# Patient Record
Sex: MALE | Race: WHITE | NOT HISPANIC OR LATINO | Employment: FULL TIME | URBAN - METROPOLITAN AREA
[De-identification: names, ages, dates, MRNs, and addresses within clinical notes are randomized per-mention and may not be internally consistent; named-entity substitution may affect disease eponyms.]

---

## 2018-06-05 ENCOUNTER — APPOINTMENT (OUTPATIENT)
Dept: CT IMAGING | Facility: HOSPITAL | Age: 43
End: 2018-06-05

## 2018-06-05 ENCOUNTER — HOSPITAL ENCOUNTER (EMERGENCY)
Facility: HOSPITAL | Age: 43
Discharge: HOME | End: 2018-06-05
Attending: EMERGENCY MEDICINE | Admitting: EMERGENCY MEDICINE

## 2018-06-05 VITALS
HEART RATE: 68 BPM | HEIGHT: 73 IN | WEIGHT: 185 LBS | BODY MASS INDEX: 24.52 KG/M2 | TEMPERATURE: 98.6 F | RESPIRATION RATE: 16 BRPM | DIASTOLIC BLOOD PRESSURE: 54 MMHG | SYSTOLIC BLOOD PRESSURE: 134 MMHG | OXYGEN SATURATION: 99 %

## 2018-06-05 DIAGNOSIS — E86.0 DEHYDRATION: ICD-10-CM

## 2018-06-05 DIAGNOSIS — R10.9 ABDOMINAL PAIN, UNSPECIFIED ABDOMINAL LOCATION: Primary | ICD-10-CM

## 2018-06-05 DIAGNOSIS — K57.90 DIVERTICULOSIS OF INTESTINE WITHOUT BLEEDING, UNSPECIFIED INTESTINAL TRACT LOCATION: ICD-10-CM

## 2018-06-05 DIAGNOSIS — K52.9 GASTROENTERITIS: ICD-10-CM

## 2018-06-05 LAB
ALBUMIN SERPL-MCNC: 4.51 G/DL (ref 3.2–4.8)
ALBUMIN/GLOB SERPL: 1.5 G/DL (ref 1.5–2.5)
ALP SERPL-CCNC: 76 U/L (ref 25–100)
ALT SERPL W P-5'-P-CCNC: 45 U/L (ref 7–40)
ANION GAP SERPL CALCULATED.3IONS-SCNC: 8 MMOL/L (ref 3–11)
AST SERPL-CCNC: 25 U/L (ref 0–33)
BASOPHILS # BLD AUTO: 0.02 10*3/MM3 (ref 0–0.2)
BASOPHILS NFR BLD AUTO: 0.4 % (ref 0–1)
BILIRUB SERPL-MCNC: 0.8 MG/DL (ref 0.3–1.2)
BILIRUB UR QL STRIP: NEGATIVE
BUN BLD-MCNC: 13 MG/DL (ref 9–23)
BUN/CREAT SERPL: 14.3 (ref 7–25)
CALCIUM SPEC-SCNC: 9.2 MG/DL (ref 8.7–10.4)
CHLORIDE SERPL-SCNC: 106 MMOL/L (ref 99–109)
CLARITY UR: CLEAR
CO2 SERPL-SCNC: 27 MMOL/L (ref 20–31)
COLOR UR: ABNORMAL
CREAT BLD-MCNC: 0.91 MG/DL (ref 0.6–1.3)
CRP SERPL-MCNC: 0.4 MG/DL (ref 0–1)
D-LACTATE SERPL-SCNC: 1.4 MMOL/L (ref 0.5–2)
DEPRECATED RDW RBC AUTO: 36.7 FL (ref 37–54)
EOSINOPHIL # BLD AUTO: 0.27 10*3/MM3 (ref 0–0.3)
EOSINOPHIL NFR BLD AUTO: 5.6 % (ref 0–3)
ERYTHROCYTE [DISTWIDTH] IN BLOOD BY AUTOMATED COUNT: 11.9 % (ref 11.3–14.5)
ERYTHROCYTE [SEDIMENTATION RATE] IN BLOOD: 6 MM/HR (ref 0–15)
GFR SERPL CREATININE-BSD FRML MDRD: 92 ML/MIN/1.73
GLOBULIN UR ELPH-MCNC: 3 GM/DL
GLUCOSE BLD-MCNC: 109 MG/DL (ref 70–100)
GLUCOSE UR STRIP-MCNC: NEGATIVE MG/DL
HCT VFR BLD AUTO: 44.6 % (ref 38.9–50.9)
HGB BLD-MCNC: 15.8 G/DL (ref 13.1–17.5)
HGB UR QL STRIP.AUTO: NEGATIVE
HOLD SPECIMEN: NORMAL
HOLD SPECIMEN: NORMAL
IMM GRANULOCYTES # BLD: 0.01 10*3/MM3 (ref 0–0.03)
IMM GRANULOCYTES NFR BLD: 0.2 % (ref 0–0.6)
KETONES UR QL STRIP: NEGATIVE
LEUKOCYTE ESTERASE UR QL STRIP.AUTO: NEGATIVE
LIPASE SERPL-CCNC: 42 U/L (ref 6–51)
LYMPHOCYTES # BLD AUTO: 1.89 10*3/MM3 (ref 0.6–4.8)
LYMPHOCYTES NFR BLD AUTO: 39.1 % (ref 24–44)
MCH RBC QN AUTO: 30.3 PG (ref 27–31)
MCHC RBC AUTO-ENTMCNC: 35.4 G/DL (ref 32–36)
MCV RBC AUTO: 85.4 FL (ref 80–99)
MONOCYTES # BLD AUTO: 0.26 10*3/MM3 (ref 0–1)
MONOCYTES NFR BLD AUTO: 5.4 % (ref 0–12)
NEUTROPHILS # BLD AUTO: 2.39 10*3/MM3 (ref 1.5–8.3)
NEUTROPHILS NFR BLD AUTO: 49.5 % (ref 41–71)
NITRITE UR QL STRIP: NEGATIVE
PH UR STRIP.AUTO: 6 [PH] (ref 5–8)
PLATELET # BLD AUTO: 233 10*3/MM3 (ref 150–450)
PMV BLD AUTO: 10.6 FL (ref 6–12)
POTASSIUM BLD-SCNC: 3.6 MMOL/L (ref 3.5–5.5)
PROT SERPL-MCNC: 7.5 G/DL (ref 5.7–8.2)
PROT UR QL STRIP: NEGATIVE
RBC # BLD AUTO: 5.22 10*6/MM3 (ref 4.2–5.76)
SODIUM BLD-SCNC: 141 MMOL/L (ref 132–146)
SP GR UR STRIP: >=1.03 (ref 1–1.03)
UROBILINOGEN UR QL STRIP: ABNORMAL
WBC NRBC COR # BLD: 4.83 10*3/MM3 (ref 3.5–10.8)
WHOLE BLOOD HOLD SPECIMEN: NORMAL
WHOLE BLOOD HOLD SPECIMEN: NORMAL

## 2018-06-05 PROCEDURE — 85025 COMPLETE CBC W/AUTO DIFF WBC: CPT

## 2018-06-05 PROCEDURE — 81003 URINALYSIS AUTO W/O SCOPE: CPT | Performed by: EMERGENCY MEDICINE

## 2018-06-05 PROCEDURE — 80053 COMPREHEN METABOLIC PANEL: CPT

## 2018-06-05 PROCEDURE — 96374 THER/PROPH/DIAG INJ IV PUSH: CPT

## 2018-06-05 PROCEDURE — 83605 ASSAY OF LACTIC ACID: CPT

## 2018-06-05 PROCEDURE — 96361 HYDRATE IV INFUSION ADD-ON: CPT

## 2018-06-05 PROCEDURE — 25010000002 ONDANSETRON PER 1 MG: Performed by: EMERGENCY MEDICINE

## 2018-06-05 PROCEDURE — 86140 C-REACTIVE PROTEIN: CPT | Performed by: EMERGENCY MEDICINE

## 2018-06-05 PROCEDURE — 85652 RBC SED RATE AUTOMATED: CPT | Performed by: EMERGENCY MEDICINE

## 2018-06-05 PROCEDURE — 83690 ASSAY OF LIPASE: CPT

## 2018-06-05 PROCEDURE — 74176 CT ABD & PELVIS W/O CONTRAST: CPT

## 2018-06-05 PROCEDURE — 99284 EMERGENCY DEPT VISIT MOD MDM: CPT

## 2018-06-05 RX ORDER — SODIUM CHLORIDE 9 MG/ML
200 INJECTION, SOLUTION INTRAVENOUS CONTINUOUS
Status: DISCONTINUED | OUTPATIENT
Start: 2018-06-05 | End: 2018-06-05 | Stop reason: HOSPADM

## 2018-06-05 RX ORDER — ONDANSETRON 2 MG/ML
4 INJECTION INTRAMUSCULAR; INTRAVENOUS ONCE
Status: COMPLETED | OUTPATIENT
Start: 2018-06-05 | End: 2018-06-05

## 2018-06-05 RX ORDER — ONDANSETRON 4 MG/1
4-8 TABLET, ORALLY DISINTEGRATING ORAL EVERY 6 HOURS PRN
Qty: 20 TABLET | Refills: 0 | Status: SHIPPED | OUTPATIENT
Start: 2018-06-05 | End: 2021-08-09

## 2018-06-05 RX ORDER — SODIUM CHLORIDE 0.9 % (FLUSH) 0.9 %
10 SYRINGE (ML) INJECTION AS NEEDED
Status: DISCONTINUED | OUTPATIENT
Start: 2018-06-05 | End: 2018-06-05 | Stop reason: HOSPADM

## 2018-06-05 RX ADMIN — ONDANSETRON 4 MG: 2 INJECTION, SOLUTION INTRAMUSCULAR; INTRAVENOUS at 18:46

## 2018-06-05 RX ADMIN — BARIUM SULFATE 450 ML: 21 SUSPENSION ORAL at 18:45

## 2018-06-05 RX ADMIN — SODIUM CHLORIDE 200 ML/HR: 9 INJECTION, SOLUTION INTRAVENOUS at 18:46

## 2018-06-05 RX ADMIN — SODIUM CHLORIDE 1000 ML: 9 INJECTION, SOLUTION INTRAVENOUS at 18:46

## 2018-06-05 NOTE — ED PROVIDER NOTES
Subjective   Panda Garcia is a 42 y.o. male who presents to the ED with c/o lower abdominal pain with onset yesterday at 1000. The pain is described as a stabbing sensation and was constant during initial onset. However, the pain became intermittent overtime, and was partially resolved upon examination. The patient also complains of associated diarrhea x10 yesterday and blood in his stool, but denies fever, chills, sore throat, congestion, SoA, or any other acute complaints at this time. He has no hx of abdominal surgeries.             History provided by:  Patient  Abdominal Pain   Pain location:  LLQ and RLQ  Pain quality: stabbing    Pain radiates to:  Does not radiate  Pain severity:  Unable to specify  Onset quality:  Sudden  Duration: Onset yesterday.  Timing:  Intermittent  Progression:  Resolved  Chronicity:  New  Relieved by:  None tried  Worsened by:  Nothing  Ineffective treatments:  None tried  Associated symptoms: diarrhea    Associated symptoms: no chills, no cough, no fever, no shortness of breath and no sore throat        Review of Systems   Constitutional: Negative for chills and fever.   HENT: Negative for congestion and sore throat.    Respiratory: Negative for cough and shortness of breath.    Gastrointestinal: Positive for abdominal pain, blood in stool and diarrhea.   Genitourinary:        Darker color urine   All other systems reviewed and are negative.      History reviewed. No pertinent past medical history.    Allergies   Allergen Reactions   • Iodinated Diagnostic Agents Hives       Past Surgical History:   Procedure Laterality Date   • KNEE SURGERY         History reviewed. No pertinent family history.    Social History     Social History   • Marital status:      Social History Main Topics   • Smoking status: Former Smoker   • Alcohol use Yes      Comment: occasional   • Drug use: No     Other Topics Concern   • Not on file         Objective   Physical Exam   Constitutional: He is  oriented to person, place, and time. He appears well-developed and well-nourished. No distress.   HENT:   Head: Normocephalic and atraumatic.   Nose: Nose normal.   Mouth/Throat: Oropharynx is clear and moist.   Eyes: Conjunctivae are normal. No scleral icterus.   Neck: Normal range of motion. Neck supple.   Cardiovascular: Normal rate, regular rhythm, normal heart sounds and intact distal pulses.    No murmur heard.  There is no tachycardia present   Pulmonary/Chest: Effort normal and breath sounds normal. No respiratory distress.   Abdominal: Soft. Bowel sounds are normal. There is tenderness (Lower abdominal with palpation). There is no rebound, no guarding and no CVA tenderness.   Musculoskeletal: Normal range of motion.   Neurological: He is alert and oriented to person, place, and time.   Skin: Skin is warm and dry.   Psychiatric: He has a normal mood and affect. His behavior is normal.   Nursing note and vitals reviewed.      Procedures         ED Course  ED Course as of Jun 05 2121   Tue Jun 05, 2018 2107 CT examination is unremarkable other than hypodensities in the kidneys thought to be cysts.  Results are obtained for comparison showingBilateral renal cysts, including a 1.5 cm right upper pole cyst.  [HH]   2108 Dr. Mcclelland is at the bedside re evaluating the patient  [DT]   2117 Patient is evaluated and much improved.  He voided in the emergency department.  He is finishing his second liter of IV fluids.  He has not had recurrence of the sharp abdominal pain he had previously.  He has never had peritoneal findingsI discussed abdominal pain precautions and indications for immediate return including residual significant pain tomorrow fever with pain or acceleration of symptoms among others.  I discussed mandatory follow-up with Dr. Lomeli as he reports he has chronic rectal bleeding, though did get a colonoscopy for this and an initial GI evaluation.  I discussed the importance of reevaluation nowI discussed  indications for follow-up with his PCP as wellVery pleasant and reliable patient verbalizes understanding agreement with plan of care, need for follow-up, and indications for immediate return  [HH]      ED Course User Index  [DT] Brandon Stark  [HH] Greg Mcclelland MD     Recent Results (from the past 24 hour(s))   Comprehensive Metabolic Panel    Collection Time: 06/05/18  4:57 PM   Result Value Ref Range    Glucose 109 (H) 70 - 100 mg/dL    BUN 13 9 - 23 mg/dL    Creatinine 0.91 0.60 - 1.30 mg/dL    Sodium 141 132 - 146 mmol/L    Potassium 3.6 3.5 - 5.5 mmol/L    Chloride 106 99 - 109 mmol/L    CO2 27.0 20.0 - 31.0 mmol/L    Calcium 9.2 8.7 - 10.4 mg/dL    Total Protein 7.5 5.7 - 8.2 g/dL    Albumin 4.51 3.20 - 4.80 g/dL    ALT (SGPT) 45 (H) 7 - 40 U/L    AST (SGOT) 25 0 - 33 U/L    Alkaline Phosphatase 76 25 - 100 U/L    Total Bilirubin 0.8 0.3 - 1.2 mg/dL    eGFR Non African Amer 92 >60 mL/min/1.73    Globulin 3.0 gm/dL    A/G Ratio 1.5 1.5 - 2.5 g/dL    BUN/Creatinine Ratio 14.3 7.0 - 25.0    Anion Gap 8.0 3.0 - 11.0 mmol/L   Lipase    Collection Time: 06/05/18  4:57 PM   Result Value Ref Range    Lipase 42 6 - 51 U/L   Light Blue Top    Collection Time: 06/05/18  4:57 PM   Result Value Ref Range    Extra Tube hold for add-on    Green Top (Gel)    Collection Time: 06/05/18  4:57 PM   Result Value Ref Range    Extra Tube Hold for add-ons.    Lavender Top    Collection Time: 06/05/18  4:57 PM   Result Value Ref Range    Extra Tube hold for add-on    Gold Top - SST    Collection Time: 06/05/18  4:57 PM   Result Value Ref Range    Extra Tube Hold for add-ons.    CBC Auto Differential    Collection Time: 06/05/18  4:57 PM   Result Value Ref Range    WBC 4.83 3.50 - 10.80 10*3/mm3    RBC 5.22 4.20 - 5.76 10*6/mm3    Hemoglobin 15.8 13.1 - 17.5 g/dL    Hematocrit 44.6 38.9 - 50.9 %    MCV 85.4 80.0 - 99.0 fL    MCH 30.3 27.0 - 31.0 pg    MCHC 35.4 32.0 - 36.0 g/dL    RDW 11.9 11.3 - 14.5 %    RDW-SD 36.7 (L) 37.0 - 54.0  fl    MPV 10.6 6.0 - 12.0 fL    Platelets 233 150 - 450 10*3/mm3    Neutrophil % 49.5 41.0 - 71.0 %    Lymphocyte % 39.1 24.0 - 44.0 %    Monocyte % 5.4 0.0 - 12.0 %    Eosinophil % 5.6 (H) 0.0 - 3.0 %    Basophil % 0.4 0.0 - 1.0 %    Immature Grans % 0.2 0.0 - 0.6 %    Neutrophils, Absolute 2.39 1.50 - 8.30 10*3/mm3    Lymphocytes, Absolute 1.89 0.60 - 4.80 10*3/mm3    Monocytes, Absolute 0.26 0.00 - 1.00 10*3/mm3    Eosinophils, Absolute 0.27 0.00 - 0.30 10*3/mm3    Basophils, Absolute 0.02 0.00 - 0.20 10*3/mm3    Immature Grans, Absolute 0.01 0.00 - 0.03 10*3/mm3   Lactic Acid, Reflex    Collection Time: 06/05/18  4:57 PM   Result Value Ref Range    Lactate 1.4 0.5 - 2.0 mmol/L   Sedimentation Rate    Collection Time: 06/05/18  4:57 PM   Result Value Ref Range    Sed Rate 6 0 - 15 mm/hr   C-reactive Protein    Collection Time: 06/05/18  4:57 PM   Result Value Ref Range    C-Reactive Protein 0.40 0.00 - 1.00 mg/dL   Urinalysis With / Culture If Indicated - Urine, Clean Catch    Collection Time: 06/05/18  6:16 PM   Result Value Ref Range    Color, UA Dark Yellow (A) Yellow, Straw    Appearance, UA Clear Clear    pH, UA 6.0 5.0 - 8.0    Specific Gravity, UA >=1.030 1.001 - 1.030    Glucose, UA Negative Negative    Ketones, UA Negative Negative    Bilirubin, UA Negative Negative    Blood, UA Negative Negative    Protein, UA Negative Negative    Leuk Esterase, UA Negative Negative    Nitrite, UA Negative Negative    Urobilinogen, UA 1.0 E.U./dL 0.2 - 1.0 E.U./dL     Note: In addition to lab results from this visit, the labs listed above may include labs taken at another facility or during a different encounter within the last 24 hours. Please correlate lab times with ED admission and discharge times for further clarification of the services performed during this visit.    CT Abdomen Pelvis Without Contrast   Final Result   1.  No acute abnormality.   2.  Few colonic diverticula.  No diverticulitis or colitis.   3.   Punctate nonobstructing bilateral renal calculi.   4.  Bilateral renal hypodensities, probable cysts but incompletely    characterized without IV contrast.      THIS DOCUMENT HAS BEEN ELECTRONICALLY SIGNED BY ZAHIDA COHEN MD        Vitals:    06/05/18 1730 06/05/18 1745 06/05/18 1800 06/05/18 1900   BP: 128/72 124/70 127/71 138/57   BP Location:       Patient Position:       Pulse:       Resp:       Temp:       TempSrc:       SpO2: 98% 99% 98% 99%   Weight:       Height:         Medications   sodium chloride 0.9 % flush 10 mL (not administered)   sodium chloride 0.9 % infusion (200 mL/hr Intravenous New Bag 6/5/18 1846)   sodium chloride 0.9 % bolus 1,000 mL (not administered)   sodium chloride 0.9 % bolus 1,000 mL (1,000 mL Intravenous New Bag 6/5/18 1846)   ondansetron (ZOFRAN) injection 4 mg (4 mg Intravenous Given 6/5/18 1846)   barium (READI-CAT 2) suspension 450 mL (450 mL Oral Given 6/5/18 1845)     ECG/EMG Results (last 24 hours)     ** No results found for the last 24 hours. **                        MDM    Final diagnoses:   Abdominal pain, unspecified abdominal location   Diverticulosis of intestine without bleeding, unspecified intestinal tract location   Dehydration   Gastroenteritis       Documentation assistance provided by shaun Stark.  Information recorded by the scribe was done at my direction and has been verified and validated by me.     Brandon Stark  06/05/18 1820       Brandon Stark  06/05/18 2108       Greg Mcclelland MD  06/05/18 2121

## 2018-06-06 NOTE — ED NOTES
"Patient is asking about being discharged, and states, \"I have to be at work at 3:30 in the morning.\"      Davy Larkin  06/05/18 2022    "

## 2018-06-06 NOTE — DISCHARGE INSTRUCTIONS
Follow up with Dr. Lomeli for GI evaluation. Call tomorrow for an appointment.     Follow up with Dr. Bhakta for residual symptoms not requiring ED care. Schedule follow up as well for residual pain, or fever with pain    Push fluids. Off work today and tomorrow if symptoms persist    Immediately return to the emergency department for any new or worsening symptoms.     Please review the medications you are supposed to be taking according to prior physician recommendations. I have not changed your home medications during this visit. If your discharge instructions indicate that I have changed your home medications, this is not the case, and you should disregard. If you have any questions about the medication you should be taking at home, please call your physician.

## 2021-08-02 ENCOUNTER — APPOINTMENT (OUTPATIENT)
Dept: GENERAL RADIOLOGY | Facility: HOSPITAL | Age: 46
End: 2021-08-02

## 2021-08-02 ENCOUNTER — HOSPITAL ENCOUNTER (OUTPATIENT)
Facility: HOSPITAL | Age: 46
Setting detail: OBSERVATION
Discharge: HOME | End: 2021-08-03
Attending: EMERGENCY MEDICINE | Admitting: INTERNAL MEDICINE

## 2021-08-02 DIAGNOSIS — R06.02 SHORTNESS OF BREATH: ICD-10-CM

## 2021-08-02 DIAGNOSIS — R53.83 FATIGUE, UNSPECIFIED TYPE: ICD-10-CM

## 2021-08-02 DIAGNOSIS — R07.9 CHEST PAIN, UNSPECIFIED TYPE: Primary | ICD-10-CM

## 2021-08-02 PROBLEM — I20.89 STABLE ANGINA: Status: ACTIVE | Noted: 2021-08-02

## 2021-08-02 PROBLEM — I20.8 STABLE ANGINA (HCC): Status: ACTIVE | Noted: 2021-08-02

## 2021-08-02 LAB
ALBUMIN SERPL-MCNC: 4.5 G/DL (ref 3.5–5.2)
ALBUMIN/GLOB SERPL: 1.6 G/DL
ALP SERPL-CCNC: 72 U/L (ref 39–117)
ALT SERPL W P-5'-P-CCNC: 22 U/L (ref 1–41)
ANION GAP SERPL CALCULATED.3IONS-SCNC: 12 MMOL/L (ref 5–15)
AST SERPL-CCNC: 22 U/L (ref 1–40)
BASOPHILS # BLD AUTO: 0.03 10*3/MM3 (ref 0–0.2)
BASOPHILS NFR BLD AUTO: 0.4 % (ref 0–1.5)
BILIRUB SERPL-MCNC: 0.5 MG/DL (ref 0–1.2)
BUN SERPL-MCNC: 15 MG/DL (ref 6–20)
BUN/CREAT SERPL: 15.6 (ref 7–25)
CALCIUM SPEC-SCNC: 9.8 MG/DL (ref 8.6–10.5)
CHLORIDE SERPL-SCNC: 102 MMOL/L (ref 98–107)
CO2 SERPL-SCNC: 22 MMOL/L (ref 22–29)
CREAT SERPL-MCNC: 0.96 MG/DL (ref 0.76–1.27)
D DIMER PPP FEU-MCNC: <0.27 MCGFEU/ML (ref 0–0.56)
DEPRECATED RDW RBC AUTO: 35.5 FL (ref 37–54)
EOSINOPHIL # BLD AUTO: 0.18 10*3/MM3 (ref 0–0.4)
EOSINOPHIL NFR BLD AUTO: 2.6 % (ref 0.3–6.2)
ERYTHROCYTE [DISTWIDTH] IN BLOOD BY AUTOMATED COUNT: 11.7 % (ref 12.3–15.4)
FLUAV RNA RESP QL NAA+PROBE: NOT DETECTED
FLUBV RNA RESP QL NAA+PROBE: NOT DETECTED
GFR SERPL CREATININE-BSD FRML MDRD: 85 ML/MIN/1.73
GLOBULIN UR ELPH-MCNC: 2.8 GM/DL
GLUCOSE SERPL-MCNC: 122 MG/DL (ref 65–99)
HCT VFR BLD AUTO: 51.3 % (ref 37.5–51)
HGB BLD-MCNC: 18.3 G/DL (ref 13–17.7)
HOLD SPECIMEN: NORMAL
IMM GRANULOCYTES # BLD AUTO: 0.02 10*3/MM3 (ref 0–0.05)
IMM GRANULOCYTES NFR BLD AUTO: 0.3 % (ref 0–0.5)
LIPASE SERPL-CCNC: 37 U/L (ref 13–60)
LYMPHOCYTES # BLD AUTO: 2.21 10*3/MM3 (ref 0.7–3.1)
LYMPHOCYTES NFR BLD AUTO: 32 % (ref 19.6–45.3)
MCH RBC QN AUTO: 30.2 PG (ref 26.6–33)
MCHC RBC AUTO-ENTMCNC: 35.7 G/DL (ref 31.5–35.7)
MCV RBC AUTO: 84.8 FL (ref 79–97)
MONOCYTES # BLD AUTO: 0.44 10*3/MM3 (ref 0.1–0.9)
MONOCYTES NFR BLD AUTO: 6.4 % (ref 5–12)
NEUTROPHILS NFR BLD AUTO: 4.03 10*3/MM3 (ref 1.7–7)
NEUTROPHILS NFR BLD AUTO: 58.3 % (ref 42.7–76)
NRBC BLD AUTO-RTO: 0 /100 WBC (ref 0–0.2)
NT-PROBNP SERPL-MCNC: <5 PG/ML (ref 0–450)
PLATELET # BLD AUTO: 303 10*3/MM3 (ref 140–450)
PMV BLD AUTO: 10.4 FL (ref 6–12)
POTASSIUM SERPL-SCNC: 3.8 MMOL/L (ref 3.5–5.2)
PROT SERPL-MCNC: 7.3 G/DL (ref 6–8.5)
QT INTERVAL: 340 MS
QT INTERVAL: 380 MS
QTC INTERVAL: 367 MS
QTC INTERVAL: 401 MS
RBC # BLD AUTO: 6.05 10*6/MM3 (ref 4.14–5.8)
SARS-COV-2 RNA RESP QL NAA+PROBE: NOT DETECTED
SODIUM SERPL-SCNC: 136 MMOL/L (ref 136–145)
TROPONIN T SERPL-MCNC: <0.01 NG/ML (ref 0–0.03)
TROPONIN T SERPL-MCNC: <0.01 NG/ML (ref 0–0.03)
WBC # BLD AUTO: 6.91 10*3/MM3 (ref 3.4–10.8)
WHOLE BLOOD HOLD SPECIMEN: NORMAL

## 2021-08-02 PROCEDURE — 85379 FIBRIN DEGRADATION QUANT: CPT | Performed by: EMERGENCY MEDICINE

## 2021-08-02 PROCEDURE — 99220 PR INITIAL OBSERVATION CARE/DAY 70 MINUTES: CPT | Performed by: INTERNAL MEDICINE

## 2021-08-02 PROCEDURE — 93005 ELECTROCARDIOGRAM TRACING: CPT | Performed by: EMERGENCY MEDICINE

## 2021-08-02 PROCEDURE — 96372 THER/PROPH/DIAG INJ SC/IM: CPT

## 2021-08-02 PROCEDURE — 80053 COMPREHEN METABOLIC PANEL: CPT

## 2021-08-02 PROCEDURE — G0378 HOSPITAL OBSERVATION PER HR: HCPCS

## 2021-08-02 PROCEDURE — 83690 ASSAY OF LIPASE: CPT

## 2021-08-02 PROCEDURE — 96374 THER/PROPH/DIAG INJ IV PUSH: CPT

## 2021-08-02 PROCEDURE — 83880 ASSAY OF NATRIURETIC PEPTIDE: CPT

## 2021-08-02 PROCEDURE — 96375 TX/PRO/DX INJ NEW DRUG ADDON: CPT

## 2021-08-02 PROCEDURE — 71045 X-RAY EXAM CHEST 1 VIEW: CPT

## 2021-08-02 PROCEDURE — 25010000002 HEPARIN (PORCINE) PER 1000 UNITS: Performed by: NURSE PRACTITIONER

## 2021-08-02 PROCEDURE — C9803 HOPD COVID-19 SPEC COLLECT: HCPCS

## 2021-08-02 PROCEDURE — 84484 ASSAY OF TROPONIN QUANT: CPT | Performed by: EMERGENCY MEDICINE

## 2021-08-02 PROCEDURE — 25010000002 KETOROLAC TROMETHAMINE PER 15 MG: Performed by: NURSE PRACTITIONER

## 2021-08-02 PROCEDURE — 84484 ASSAY OF TROPONIN QUANT: CPT

## 2021-08-02 PROCEDURE — 85025 COMPLETE CBC W/AUTO DIFF WBC: CPT

## 2021-08-02 PROCEDURE — 87636 SARSCOV2 & INF A&B AMP PRB: CPT | Performed by: NURSE PRACTITIONER

## 2021-08-02 PROCEDURE — 93005 ELECTROCARDIOGRAM TRACING: CPT

## 2021-08-02 PROCEDURE — 99284 EMERGENCY DEPT VISIT MOD MDM: CPT

## 2021-08-02 RX ORDER — NITROGLYCERIN 0.4 MG/1
0.4 TABLET SUBLINGUAL
Status: DISCONTINUED | OUTPATIENT
Start: 2021-08-02 | End: 2021-08-03 | Stop reason: HOSPADM

## 2021-08-02 RX ORDER — ONDANSETRON 4 MG/1
4 TABLET, FILM COATED ORAL EVERY 6 HOURS PRN
Status: DISCONTINUED | OUTPATIENT
Start: 2021-08-02 | End: 2021-08-03 | Stop reason: HOSPADM

## 2021-08-02 RX ORDER — ASPIRIN 81 MG/1
324 TABLET, CHEWABLE ORAL ONCE
Status: COMPLETED | OUTPATIENT
Start: 2021-08-02 | End: 2021-08-02

## 2021-08-02 RX ORDER — TAMSULOSIN HYDROCHLORIDE 0.4 MG/1
0.4 CAPSULE ORAL NIGHTLY
Status: DISCONTINUED | OUTPATIENT
Start: 2021-08-02 | End: 2021-08-03 | Stop reason: HOSPADM

## 2021-08-02 RX ORDER — AMLODIPINE BESYLATE 2.5 MG/1
2.5 TABLET ORAL
Status: DISCONTINUED | OUTPATIENT
Start: 2021-08-02 | End: 2021-08-03 | Stop reason: HOSPADM

## 2021-08-02 RX ORDER — KETOROLAC TROMETHAMINE 15 MG/ML
15 INJECTION, SOLUTION INTRAMUSCULAR; INTRAVENOUS ONCE
Status: COMPLETED | OUTPATIENT
Start: 2021-08-02 | End: 2021-08-02

## 2021-08-02 RX ORDER — SODIUM CHLORIDE 0.9 % (FLUSH) 0.9 %
10 SYRINGE (ML) INJECTION AS NEEDED
Status: DISCONTINUED | OUTPATIENT
Start: 2021-08-02 | End: 2021-08-03 | Stop reason: HOSPADM

## 2021-08-02 RX ORDER — HEPARIN SODIUM 5000 [USP'U]/ML
5000 INJECTION, SOLUTION INTRAVENOUS; SUBCUTANEOUS EVERY 12 HOURS SCHEDULED
Status: DISCONTINUED | OUTPATIENT
Start: 2021-08-02 | End: 2021-08-03 | Stop reason: HOSPADM

## 2021-08-02 RX ORDER — NITROGLYCERIN 0.4 MG/1
0.4 TABLET SUBLINGUAL ONCE
Status: COMPLETED | OUTPATIENT
Start: 2021-08-02 | End: 2021-08-02

## 2021-08-02 RX ORDER — ACETAMINOPHEN 325 MG/1
650 TABLET ORAL EVERY 6 HOURS PRN
Status: DISCONTINUED | OUTPATIENT
Start: 2021-08-02 | End: 2021-08-03 | Stop reason: HOSPADM

## 2021-08-02 RX ADMIN — SODIUM CHLORIDE, PRESERVATIVE FREE 10 ML: 5 INJECTION INTRAVENOUS at 21:48

## 2021-08-02 RX ADMIN — HEPARIN SODIUM 5000 UNITS: 5000 INJECTION INTRAVENOUS; SUBCUTANEOUS at 21:33

## 2021-08-02 RX ADMIN — NITROGLYCERIN 0.4 MG: 0.4 TABLET SUBLINGUAL at 21:52

## 2021-08-02 RX ADMIN — NITROGLYCERIN 0.4 MG: 0.4 TABLET SUBLINGUAL at 13:13

## 2021-08-02 RX ADMIN — AMLODIPINE BESYLATE 2.5 MG: 2.5 TABLET ORAL at 16:52

## 2021-08-02 RX ADMIN — ASPIRIN 81 MG CHEWABLE TABLET 243 MG: 81 TABLET CHEWABLE at 13:11

## 2021-08-02 RX ADMIN — LIDOCAINE HYDROCHLORIDE: 20 SOLUTION ORAL; TOPICAL at 13:15

## 2021-08-02 RX ADMIN — KETOROLAC TROMETHAMINE 15 MG: 15 INJECTION, SOLUTION INTRAMUSCULAR; INTRAVENOUS at 14:53

## 2021-08-02 RX ADMIN — ACETAMINOPHEN 650 MG: 325 TABLET, FILM COATED ORAL at 15:07

## 2021-08-02 RX ADMIN — NITROGLYCERIN 0.4 MG: 0.4 TABLET SUBLINGUAL at 21:46

## 2021-08-02 RX ADMIN — HEPARIN SODIUM 5000 UNITS: 5000 INJECTION INTRAVENOUS; SUBCUTANEOUS at 14:53

## 2021-08-02 NOTE — H&P
Panda Garcia  [id number]  1975   LOS: 0 days   Patient Care Team:  PHYSICIAN: Anastacio Bhakta MD     Mr. 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    Chief Complaint: Progressive disabling chest pain syndrome    Problem List:  1. Aggressive disabling chest pain syndrome  a. [facility] ED 8/2/2021 with negative troponin x2, acceptable chest x-ray and EKG with no acute ST-T changes, proBNP and d dimer WNL  b. CCS class II chest pain/NYHA class I-II dyspnea on exertion symptoms  2. Elevated blood pressure without the diagnosis of hypertension  3. Family History of CAD-brother has an ICD  4. H/O Rectal bleeding  5. Remote arthroscopic right knee surgery     Allergies   Allergen Reactions   • Iodinated Diagnostic Agents Hives     (Not in a hospital admission)    Scheduled Meds:   Continuous Infusions:   PRN Meds:.•  sodium chloride       History of Present Illness:   This is a 45-year-old  male who presented to [facility] ED 8/2/2021 with complaints of retrosternal stabbing chest pain that radiates to his left axilla and neck with concomitant shortness of breath and intermittent episodes of diaphoresis.  He denied any dizziness, nausea, vomiting, presyncope, syncope, abdominal discomfort, or palpitations when he had his chest pain.  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  He denies any eliciting factors/chronicity of chest pain.  However, he states that this pain has been present and waxing and waning in nature ever since Saturday over the past 48 hours and rest improves his symptoms.  He has not had any Covid exposure that he is aware of.  He has not been vaccinated but does get testing every Tuesday at the [employer].  The patient's brother is a longstanding  "diabetic who has an ICD but there are no other family members with known CAD.  The patient himself denies any cardiac history nor any medical history beyond a history of rectal bleeding, right knee surgery, and a possible diagnosis of hypertension.  He is not on any anti-hypertensive medications at home and denies history of any elevated cholesterol levels or diabetes.  The patient denies any history of stress tests, heart catheterizations, MIs, CVAs, TIAs, rheumatic fever, DVTs, PEs, GERD, asthma, arrhythmias, or tobacco use.  He is significantly concerned for underlying cardiac etiology since he has had symptoms for the past few days. ER evaluation at this time reveals a normal x-ray absent of acute cardiopulmonary process, normal troponins x2, EKG without acute changes (no previous EKG to compare to), and normal lab work except slightly elevated hemoglobin/hematocrit.    Cardiac risk factors: family history of premature cardiovascular disease and male gender.    Social History     Socioeconomic History   • Marital status:      Spouse name: Not on file   • Number of children: 2   • Years of education: Not on file   • Highest education level: Not on file   Tobacco Use   • Smoking status: Former Smoker   Substance and Sexual Activity   • Alcohol use: Yes     Comment: occasional   • Drug use: No     No family history on file.    Review of Systems  10 point review of systems was completed, positives outlined in the HPI, and otherwise all other systems are negative.      Objective:       Physical Exam  /96   Pulse 74   Temp 97.7 °F (36.5 °C)   Resp 24   Ht 182.9 cm (72\")   Wt 81.6 kg (180 lb)   SpO2 100%   BMI 24.41 kg/m²       08/02/21  1044   Weight: 81.6 kg (180 lb)     Body mass index is 24.41 kg/m².  No intake or output data in the 24 hours ending 08/02/21 1316    General Appearance:  Alert, cooperative, slight distress, appears stated age   Head:  Normocephalic, without obvious abnormality, " atraumatic   Neck: Supple, symmetrical, trachea midline, no adenopathy, thyroid: not enlarged, symmetric, no tenderness/mass/nodules, no carotid bruit or JVD   Lungs:   Clear to auscultation bilaterally, respirations unlabored   Heart:  Regular rate and rhythm, S1, S2 normal, no murmur, rub or gallop   Abdomen:   Soft, non-tender, no masses, no organomegaly, bowel sounds audible x4   Extremities: No edema, normal range of motion   Pulses: 2+ and symmetric   Skin: Skin color, texture, turgor normal, no rashes or lesions   Neurologic: Normal       · Cardiographics:    · EKG 8/02/2021:   Normal sinus rhythm with sinus arrhythmia  Minimal voltage criteria for LVH, may be normal variant  Borderline ECG  No previous ECGs available     · Imaging:    · Chest x-ray 8/02/2021:No evidence of acute disease in the chest    Lab Review:     Results from last 7 days   Lab Units 08/02/21  1101   SODIUM mmol/L 136   POTASSIUM mmol/L 3.8   CHLORIDE mmol/L 102   CO2 mmol/L 22.0   BUN mg/dL 15   CREATININE mg/dL 0.96   GLUCOSE mg/dL 122*   CALCIUM mg/dL 9.8     Results from last 7 days   Lab Units 08/02/21  1101   WBC 10*3/mm3 6.91   HEMOGLOBIN g/dL 18.3*   HEMATOCRIT % 51.3*   PLATELETS 10*3/mm3 303     Results from last 7 days   Lab Units 08/02/21  1101   TROPONIN T ng/mL <0.010     · D-dimer less than 0.27  · proBNP less than 5     Assessment:   Patient with disabling chest pain in the setting of elevated blood pressure.  His ECG showed no acute ST-T changes, chest x-ray was acceptable, and troponins have been negative x2.  Doubt ACS.  We will admit the patient for observation and plan for a stress test in the morning to assess for focal obstructive CAD.  We will also order an echocardiogram to assess heart structure/function. He has unknown cholesterol status so we will order FLP for the morning.  His history of possible mild to moderate anaphylaxis with contrast limits diagnostic studies at this time.     Plan:    1.  Defer LHC at  this time.   2.  Repeat troponin, BMP, EKG in AM.   3.  Echocardiogram    4.  NPO after midnight except for meds   5.  Toradol 15 mg IV X1.    6.  NTG SL and Zofran PRN   7.  FLP in morning   8.  Full code    9.  Cardiac PET in morning  10. Amlodipine 2.5mg daily  11. Covid screening ordered    Discussed with patient and wife in room.    Scribe: Adrian Zuñiga PA-C for Dr. Kevin Hester M.D.     I have seen and examined the patient, case was discussed with the physician extender, reviewed the above note, necessary changes were made and I agree with the final note.     Kevin Hester MD [facility]

## 2021-08-02 NOTE — ED PROVIDER NOTES
Subjective   Patient is a 45 y.o. male presenting to the ED complaining of chest pain. The patient reports a left chest pain beginning two days ago. He states that it feels like a tightness and radiates to his left armpit and left leg. The pain is constant, with nothing making it feel better or worse. He also reports fatigue and shortness of breath. He denies fever, loss of taste and smell, and diarrhea. The patient has no history of heart issues and has never had a stress test or cardiac cath. He does have a history of hematochezia without known cause. There are no other acute symptoms at this time.      History provided by:  Patient  Chest Pain  Pain quality: tightness    Radiates to: Left armpit and left leg.  Pain severity:  Moderate  Onset quality:  Sudden  Duration:  2 days  Timing:  Constant  Progression:  Unchanged  Chronicity:  New  Relieved by:  None tried  Worsened by:  Nothing  Ineffective treatments:  None tried  Associated symptoms: fatigue and shortness of breath    Associated symptoms: no fever    Risk factors: male sex    Risk factors: no aortic disease, no coronary artery disease, no diabetes mellitus, no hypertension, not obese and no smoking        Review of Systems   Constitutional: Positive for fatigue. Negative for fever.   Respiratory: Positive for shortness of breath.    Cardiovascular: Positive for chest pain.   Gastrointestinal: Negative for diarrhea.   Neurological:        No loss of taste or smell.   All other systems reviewed and are negative.      No past medical history on file.    Allergies   Allergen Reactions   • Iodinated Diagnostic Agents Anaphylaxis       Past Surgical History:   Procedure Laterality Date   • KNEE SURGERY         No family history on file.    Social History     Socioeconomic History   • Marital status:      Spouse name: Not on file   • Number of children: Not on file   • Years of education: Not on file   • Highest education level: Not on file   Tobacco  Use   • Smoking status: Former Smoker   Substance and Sexual Activity   • Alcohol use: Yes     Comment: occasional   • Drug use: No         Objective   Physical Exam  Vitals and nursing note reviewed.   Constitutional:       General: He is not in acute distress.     Appearance: Normal appearance.   HENT:      Head: Normocephalic and atraumatic.      Right Ear: External ear normal.      Left Ear: External ear normal.      Nose: Nose normal.   Eyes:      General: No scleral icterus.     Conjunctiva/sclera: Conjunctivae normal.   Cardiovascular:      Rate and Rhythm: Normal rate and regular rhythm.      Heart sounds: Normal heart sounds.   Pulmonary:      Breath sounds: Normal breath sounds.      Comments: Increased work of breathing.  Abdominal:      General: There is no distension.      Palpations: Abdomen is soft.      Tenderness: There is no abdominal tenderness.   Musculoskeletal:         General: Normal range of motion.      Cervical back: Normal range of motion and neck supple.   Skin:     General: Skin is warm and dry.   Neurological:      General: No focal deficit present.      Mental Status: He is alert and oriented to person, place, and time.   Psychiatric:      Comments: Anxious.         Procedures         ED Course     Recent Results (from the past 24 hour(s))   Troponin    Collection Time: 08/02/21 11:01 AM    Specimen: Blood   Result Value Ref Range    Troponin T <0.010 0.000 - 0.030 ng/mL   Comprehensive Metabolic Panel    Collection Time: 08/02/21 11:01 AM    Specimen: Blood   Result Value Ref Range    Glucose 122 (H) 65 - 99 mg/dL    BUN 15 6 - 20 mg/dL    Creatinine 0.96 0.76 - 1.27 mg/dL    Sodium 136 136 - 145 mmol/L    Potassium 3.8 3.5 - 5.2 mmol/L    Chloride 102 98 - 107 mmol/L    CO2 22.0 22.0 - 29.0 mmol/L    Calcium 9.8 8.6 - 10.5 mg/dL    Total Protein 7.3 6.0 - 8.5 g/dL    Albumin 4.50 3.50 - 5.20 g/dL    ALT (SGPT) 22 1 - 41 U/L    AST (SGOT) 22 1 - 40 U/L    Alkaline Phosphatase 72 39 -  117 U/L    Total Bilirubin 0.5 0.0 - 1.2 mg/dL    eGFR Non African Amer 85 >60 mL/min/1.73    Globulin 2.8 gm/dL    A/G Ratio 1.6 g/dL    BUN/Creatinine Ratio 15.6 7.0 - 25.0    Anion Gap 12.0 5.0 - 15.0 mmol/L   Lipase    Collection Time: 08/02/21 11:01 AM    Specimen: Blood   Result Value Ref Range    Lipase 37 13 - 60 U/L   BNP    Collection Time: 08/02/21 11:01 AM    Specimen: Blood   Result Value Ref Range    proBNP <5.0 0.0 - 450.0 pg/mL   Green Top (Gel)    Collection Time: 08/02/21 11:01 AM   Result Value Ref Range    Extra Tube Hold for add-ons.    Lavender Top    Collection Time: 08/02/21 11:01 AM   Result Value Ref Range    Extra Tube hold for add-on    Gold Top - SST    Collection Time: 08/02/21 11:01 AM   Result Value Ref Range    Extra Tube Hold for add-ons.    CBC Auto Differential    Collection Time: 08/02/21 11:01 AM    Specimen: Blood   Result Value Ref Range    WBC 6.91 3.40 - 10.80 10*3/mm3    RBC 6.05 (H) 4.14 - 5.80 10*6/mm3    Hemoglobin 18.3 (H) 13.0 - 17.7 g/dL    Hematocrit 51.3 (H) 37.5 - 51.0 %    MCV 84.8 79.0 - 97.0 fL    MCH 30.2 26.6 - 33.0 pg    MCHC 35.7 31.5 - 35.7 g/dL    RDW 11.7 (L) 12.3 - 15.4 %    RDW-SD 35.5 (L) 37.0 - 54.0 fl    MPV 10.4 6.0 - 12.0 fL    Platelets 303 140 - 450 10*3/mm3    Neutrophil % 58.3 42.7 - 76.0 %    Lymphocyte % 32.0 19.6 - 45.3 %    Monocyte % 6.4 5.0 - 12.0 %    Eosinophil % 2.6 0.3 - 6.2 %    Basophil % 0.4 0.0 - 1.5 %    Immature Grans % 0.3 0.0 - 0.5 %    Neutrophils, Absolute 4.03 1.70 - 7.00 10*3/mm3    Lymphocytes, Absolute 2.21 0.70 - 3.10 10*3/mm3    Monocytes, Absolute 0.44 0.10 - 0.90 10*3/mm3    Eosinophils, Absolute 0.18 0.00 - 0.40 10*3/mm3    Basophils, Absolute 0.03 0.00 - 0.20 10*3/mm3    Immature Grans, Absolute 0.02 0.00 - 0.05 10*3/mm3    nRBC 0.0 0.0 - 0.2 /100 WBC     Note: In addition to lab results from this visit, the labs listed above may include labs taken at another facility or during a different encounter within the last  "24 hours. Please correlate lab times with ED admission and discharge times for further clarification of the services performed during this visit.    XR Chest 1 View   Final Result   No evidence of acute disease in the chest.        This report was finalized on 8/2/2021 11:33 AM by Eulogio Conte.            Vitals:    08/02/21 1039 08/02/21 1044 08/02/21 1234 08/02/21 1313   BP: 146/88  155/88 162/96   Pulse: 75  64 74   Resp: 24      Temp: 97.7 °F (36.5 °C)      SpO2: 99%  100%    Weight:  81.6 kg (180 lb)     Height:  182.9 cm (72\")       Medications   sodium chloride 0.9 % flush 10 mL (has no administration in time range)   aluminum-magnesium hydroxide-simethicone (MAALOX MAX) 400-400-40 MG/5ML 15 mL, Lidocaine Viscous HCl (XYLOCAINE) 2 % 15 mL suspension ( Oral Given 8/2/21 1315)   aspirin chewable tablet 324 mg (243 mg Oral Given 8/2/21 1311)   nitroglycerin (NITROSTAT) SL tablet 0.4 mg (0.4 mg Sublingual Given 8/2/21 1313)     ECG/EMG Results (last 24 hours)     Procedure Component Value Units Date/Time    ECG 12 Lead [[id number]] Collected: 08/02/21 1046     Updated: 08/02/21 1237        ECG 12 Lead         ECG 12 Lead           The patient has multiple complaints, making it hard to determine what the acute symptoms are.  His primary complaint is the chest pain. I discussed the case with cardiology.  They evaluated in the ED and will admit for further evaluation and management.                                         MDM    Final diagnoses:   Chest pain, unspecified type   Fatigue, unspecified type   Shortness of breath       Documentation assistance provided by shaun Jones.  Information recorded by the shaun was done at my direction and has been verified and validated by me.     Bertin Jones  08/02/21 134       Augustine Samson DO  08/03/21 0221    "

## 2021-08-02 NOTE — PLAN OF CARE
Goal Outcome Evaluation:  Plan of Care Reviewed With: patient        Progress: improving  Outcome Summary: Pt VSS and on RA. PT alert and oriented, up independently. Pt c/o chest pain 7/10. EKG obtained in ED and resulted negative. Pt to be NPO after midnight for stress test tomorrow. ECHO to be obtained this evening. Will continue to monitor.

## 2021-08-03 ENCOUNTER — APPOINTMENT (OUTPATIENT)
Dept: CARDIOLOGY | Facility: HOSPITAL | Age: 46
End: 2021-08-03

## 2021-08-03 ENCOUNTER — READMISSION MANAGEMENT (OUTPATIENT)
Dept: CALL CENTER | Facility: HOSPITAL | Age: 46
End: 2021-08-03

## 2021-08-03 VITALS
WEIGHT: 180 LBS | BODY MASS INDEX: 24.38 KG/M2 | RESPIRATION RATE: 16 BRPM | OXYGEN SATURATION: 97 % | TEMPERATURE: 97.9 F | DIASTOLIC BLOOD PRESSURE: 82 MMHG | HEART RATE: 70 BPM | HEIGHT: 72 IN | SYSTOLIC BLOOD PRESSURE: 136 MMHG

## 2021-08-03 LAB
ANION GAP SERPL CALCULATED.3IONS-SCNC: 10 MMOL/L (ref 5–15)
BH CV ECHO MEAS - AO MAX PG (FULL): 5.1 MMHG
BH CV ECHO MEAS - AO MAX PG: 7.7 MMHG
BH CV ECHO MEAS - AO MEAN PG (FULL): 3.4 MMHG
BH CV ECHO MEAS - AO MEAN PG: 5.1 MMHG
BH CV ECHO MEAS - AO ROOT AREA (BSA CORRECTED): 1.4
BH CV ECHO MEAS - AO ROOT AREA: 6.1 CM^2
BH CV ECHO MEAS - AO ROOT DIAM: 2.8 CM
BH CV ECHO MEAS - AO V2 MAX: 139 CM/SEC
BH CV ECHO MEAS - AO V2 MEAN: 108.8 CM/SEC
BH CV ECHO MEAS - AO V2 VTI: 29.9 CM
BH CV ECHO MEAS - AVA(I,A): 1.9 CM^2
BH CV ECHO MEAS - AVA(I,D): 2 CM^2
BH CV ECHO MEAS - AVA(V,A): 1.8 CM^2
BH CV ECHO MEAS - AVA(V,D): 1.8 CM^2
BH CV ECHO MEAS - BSA(HAYCOCK): 2 M^2
BH CV ECHO MEAS - BSA: 2 M^2
BH CV ECHO MEAS - BZI_BMI: 24.4 KILOGRAMS/M^2
BH CV ECHO MEAS - BZI_METRIC_HEIGHT: 182.9 CM
BH CV ECHO MEAS - BZI_METRIC_WEIGHT: 81.6 KG
BH CV ECHO MEAS - EDV(CUBED): 143.7 ML
BH CV ECHO MEAS - EDV(MOD-SP2): 206 ML
BH CV ECHO MEAS - EDV(MOD-SP4): 190 ML
BH CV ECHO MEAS - EDV(TEICH): 131.7 ML
BH CV ECHO MEAS - EF(CUBED): 65.6 %
BH CV ECHO MEAS - EF(MOD-BP): 56 %
BH CV ECHO MEAS - EF(MOD-SP2): 59.7 %
BH CV ECHO MEAS - EF(MOD-SP4): 55.3 %
BH CV ECHO MEAS - EF(TEICH): 56.7 %
BH CV ECHO MEAS - ESV(CUBED): 49.4 ML
BH CV ECHO MEAS - ESV(MOD-SP2): 83 ML
BH CV ECHO MEAS - ESV(MOD-SP4): 85 ML
BH CV ECHO MEAS - ESV(TEICH): 57 ML
BH CV ECHO MEAS - FS: 30 %
BH CV ECHO MEAS - IVS/LVPW: 0.8
BH CV ECHO MEAS - IVSD: 0.8 CM
BH CV ECHO MEAS - LA DIMENSION: 4 CM
BH CV ECHO MEAS - LA/AO: 1.4
BH CV ECHO MEAS - LAD MAJOR: 5.3 CM
BH CV ECHO MEAS - LAT PEAK E' VEL: 11.1 CM/SEC
BH CV ECHO MEAS - LATERAL E/E' RATIO: 5.8
BH CV ECHO MEAS - LV DIASTOLIC VOL/BSA (35-75): 93.3 ML/M^2
BH CV ECHO MEAS - LV MASS(C)D: 164.5 GRAMS
BH CV ECHO MEAS - LV MASS(C)DI: 80.8 GRAMS/M^2
BH CV ECHO MEAS - LV MAX PG: 2.6 MMHG
BH CV ECHO MEAS - LV MEAN PG: 1.7 MMHG
BH CV ECHO MEAS - LV SYSTOLIC VOL/BSA (12-30): 41.7 ML/M^2
BH CV ECHO MEAS - LV V1 MAX: 80.6 CM/SEC
BH CV ECHO MEAS - LV V1 MEAN: 62.5 CM/SEC
BH CV ECHO MEAS - LV V1 VTI: 18.1 CM
BH CV ECHO MEAS - LVIDD: 5.2 CM
BH CV ECHO MEAS - LVIDS: 3.7 CM
BH CV ECHO MEAS - LVLD AP2: 8.7 CM
BH CV ECHO MEAS - LVLD AP4: 8.8 CM
BH CV ECHO MEAS - LVLS AP2: 6.8 CM
BH CV ECHO MEAS - LVLS AP4: 7.3 CM
BH CV ECHO MEAS - LVOT AREA (M): 3.1 CM^2
BH CV ECHO MEAS - LVOT AREA: 3.1 CM^2
BH CV ECHO MEAS - LVOT DIAM: 2 CM
BH CV ECHO MEAS - LVPWD: 1 CM
BH CV ECHO MEAS - MED PEAK E' VEL: 9.2 CM/SEC
BH CV ECHO MEAS - MEDIAL E/E' RATIO: 6.9
BH CV ECHO MEAS - MV A MAX VEL: 58.4 CM/SEC
BH CV ECHO MEAS - MV DEC TIME: 0.13 SEC
BH CV ECHO MEAS - MV E MAX VEL: 65 CM/SEC
BH CV ECHO MEAS - MV E/A: 1.1
BH CV ECHO MEAS - MV MAX PG: 3.9 MMHG
BH CV ECHO MEAS - MV MEAN PG: 1.5 MMHG
BH CV ECHO MEAS - MV V2 MAX: 98.3 CM/SEC
BH CV ECHO MEAS - MV V2 MEAN: 57.1 CM/SEC
BH CV ECHO MEAS - MV V2 VTI: 23.7 CM
BH CV ECHO MEAS - MVA(VTI): 2.4 CM^2
BH CV ECHO MEAS - PA ACC SLOPE: 512.8 CM/SEC^2
BH CV ECHO MEAS - PA ACC TIME: 0.13 SEC
BH CV ECHO MEAS - PA MAX PG: 5.3 MMHG
BH CV ECHO MEAS - PA PR(ACCEL): 18.8 MMHG
BH CV ECHO MEAS - PA V2 MAX: 114.8 CM/SEC
BH CV ECHO MEAS - SI(AO): 89.5 ML/M^2
BH CV ECHO MEAS - SI(CUBED): 46.3 ML/M^2
BH CV ECHO MEAS - SI(LVOT): 27.5 ML/M^2
BH CV ECHO MEAS - SI(MOD-SP2): 60.4 ML/M^2
BH CV ECHO MEAS - SI(MOD-SP4): 51.5 ML/M^2
BH CV ECHO MEAS - SI(TEICH): 36.7 ML/M^2
BH CV ECHO MEAS - SV(AO): 182.3 ML
BH CV ECHO MEAS - SV(CUBED): 94.3 ML
BH CV ECHO MEAS - SV(LVOT): 55.9 ML
BH CV ECHO MEAS - SV(MOD-SP2): 123 ML
BH CV ECHO MEAS - SV(MOD-SP4): 105 ML
BH CV ECHO MEAS - SV(TEICH): 74.7 ML
BH CV ECHO MEAS - TAPSE (>1.6): 2.2 CM
BH CV ECHO MEASUREMENTS AVERAGE E/E' RATIO: 6.4
BH CV NUCLEAR PRIOR STUDY: 3
BH CV REST NUCLEAR ISOTOPE DOSE: 26.7 MCI
BH CV STRESS BP STAGE 1: NORMAL
BH CV STRESS BP STAGE 3: NORMAL
BH CV STRESS BP STAGE 4: NORMAL
BH CV STRESS COMMENTS STAGE 1: NORMAL
BH CV STRESS DOSE REGADENOSON STAGE 1: 0.4
BH CV STRESS DURATION MIN STAGE 1: 0
BH CV STRESS DURATION MIN STAGE 2: 1
BH CV STRESS DURATION MIN STAGE 3: 1
BH CV STRESS DURATION MIN STAGE 4: 1
BH CV STRESS DURATION SEC STAGE 1: 10
BH CV STRESS HR STAGE 1: 92
BH CV STRESS HR STAGE 2: 94
BH CV STRESS HR STAGE 3: 87
BH CV STRESS HR STAGE 4: 87
BH CV STRESS NUCLEAR ISOTOPE DOSE: 26.7 MCI
BH CV STRESS O2 STAGE 1: 99
BH CV STRESS O2 STAGE 2: 98
BH CV STRESS O2 STAGE 3: 98
BH CV STRESS O2 STAGE 4: 98
BH CV STRESS PROTOCOL 1: NORMAL
BH CV STRESS RECOVERY BP: NORMAL MMHG
BH CV STRESS RECOVERY HR: 80 BPM
BH CV STRESS RECOVERY O2: 97 %
BH CV STRESS STAGE 1: 1
BH CV STRESS STAGE 2: 2
BH CV STRESS STAGE 3: 3
BH CV STRESS STAGE 4: 4
BH CV XLRA - RV BASE: 4.1 CM
BH CV XLRA - RV LENGTH: 7.6 CM
BH CV XLRA - RV MID: 3.4 CM
BH CV XLRA - TDI S': 12.2 CM/SEC
BUN SERPL-MCNC: 17 MG/DL (ref 6–20)
BUN/CREAT SERPL: 17.9 (ref 7–25)
CALCIUM SPEC-SCNC: 8.8 MG/DL (ref 8.6–10.5)
CHLORIDE SERPL-SCNC: 104 MMOL/L (ref 98–107)
CHOLEST SERPL-MCNC: 162 MG/DL (ref 0–200)
CO2 SERPL-SCNC: 22 MMOL/L (ref 22–29)
CREAT SERPL-MCNC: 0.95 MG/DL (ref 0.76–1.27)
GFR SERPL CREATININE-BSD FRML MDRD: 86 ML/MIN/1.73
GLUCOSE SERPL-MCNC: 123 MG/DL (ref 65–99)
HDLC SERPL-MCNC: 37 MG/DL (ref 40–60)
LDLC SERPL CALC-MCNC: 99 MG/DL (ref 0–100)
LDLC/HDLC SERPL: 2.6 {RATIO}
LEFT ATRIUM VOLUME INDEX: 30.4 ML/M^2
LEFT ATRIUM VOLUME: 62 ML
LV EF 2D ECHO EST: 55 %
LV EF NUC BP: 49 %
MAXIMAL PREDICTED HEART RATE: 176 BPM
MAXIMAL PREDICTED HEART RATE: 176 BPM
PERCENT MAX PREDICTED HR: 56.82 %
POTASSIUM SERPL-SCNC: 4 MMOL/L (ref 3.5–5.2)
QT INTERVAL: 376 MS
QTC INTERVAL: 394 MS
SODIUM SERPL-SCNC: 136 MMOL/L (ref 136–145)
STRESS BASELINE BP: NORMAL MMHG
STRESS BASELINE HR: 66 BPM
STRESS O2 SAT REST: 98 %
STRESS PERCENT HR: 67 %
STRESS POST EXERCISE DUR MIN: 4 MIN
STRESS POST EXERCISE DUR SEC: 0 SEC
STRESS POST O2 SAT PEAK: 98 %
STRESS POST PEAK BP: NORMAL MMHG
STRESS POST PEAK HR: 100 BPM
STRESS TARGET HR: 150 BPM
STRESS TARGET HR: 150 BPM
TRIGL SERPL-MCNC: 144 MG/DL (ref 0–150)
TROPONIN T SERPL-MCNC: <0.01 NG/ML (ref 0–0.03)
VLDLC SERPL-MCNC: 26 MG/DL (ref 5–40)

## 2021-08-03 PROCEDURE — 78492 MYOCRD IMG PET MLT RST&STRS: CPT

## 2021-08-03 PROCEDURE — 84484 ASSAY OF TROPONIN QUANT: CPT | Performed by: NURSE PRACTITIONER

## 2021-08-03 PROCEDURE — 96372 THER/PROPH/DIAG INJ SC/IM: CPT

## 2021-08-03 PROCEDURE — 93018 CV STRESS TEST I&R ONLY: CPT | Performed by: INTERNAL MEDICINE

## 2021-08-03 PROCEDURE — 80061 LIPID PANEL: CPT | Performed by: NURSE PRACTITIONER

## 2021-08-03 PROCEDURE — G0378 HOSPITAL OBSERVATION PER HR: HCPCS

## 2021-08-03 PROCEDURE — 80048 BASIC METABOLIC PNL TOTAL CA: CPT | Performed by: NURSE PRACTITIONER

## 2021-08-03 PROCEDURE — 93306 TTE W/DOPPLER COMPLETE: CPT

## 2021-08-03 PROCEDURE — 25010000002 HEPARIN (PORCINE) PER 1000 UNITS: Performed by: NURSE PRACTITIONER

## 2021-08-03 PROCEDURE — 93017 CV STRESS TEST TRACING ONLY: CPT

## 2021-08-03 PROCEDURE — 93005 ELECTROCARDIOGRAM TRACING: CPT | Performed by: NURSE PRACTITIONER

## 2021-08-03 PROCEDURE — 93306 TTE W/DOPPLER COMPLETE: CPT | Performed by: INTERNAL MEDICINE

## 2021-08-03 PROCEDURE — A9555 RB82 RUBIDIUM: HCPCS | Performed by: NURSE PRACTITIONER

## 2021-08-03 PROCEDURE — 99217 PR OBSERVATION CARE DISCHARGE MANAGEMENT: CPT | Performed by: INTERNAL MEDICINE

## 2021-08-03 PROCEDURE — 25010000002 REGADENOSON 0.4 MG/5ML SOLUTION: Performed by: NURSE PRACTITIONER

## 2021-08-03 PROCEDURE — 78492 MYOCRD IMG PET MLT RST&STRS: CPT | Performed by: INTERNAL MEDICINE

## 2021-08-03 PROCEDURE — 0 RUBIDIUM CHLORIDE: Performed by: NURSE PRACTITIONER

## 2021-08-03 RX ORDER — NITROGLYCERIN 0.4 MG/1
0.4 TABLET SUBLINGUAL
Qty: 25 TABLET | Refills: 12 | Status: SHIPPED | OUTPATIENT
Start: 2021-08-03

## 2021-08-03 RX ORDER — ALBUTEROL SULFATE 90 UG/1
2 AEROSOL, METERED RESPIRATORY (INHALATION) EVERY 4 HOURS PRN
Qty: 6.7 G | Refills: 0 | Status: SHIPPED | OUTPATIENT
Start: 2021-08-03

## 2021-08-03 RX ORDER — AMLODIPINE BESYLATE 2.5 MG/1
2.5 TABLET ORAL
Qty: 90 TABLET | Refills: 3 | Status: SHIPPED | OUTPATIENT
Start: 2021-08-04 | End: 2021-08-09 | Stop reason: SDUPTHER

## 2021-08-03 RX ADMIN — RUBIDIUM CHLORIDE RB-82 1 DOSE: 150 INJECTION, SOLUTION INTRAVENOUS at 08:47

## 2021-08-03 RX ADMIN — RUBIDIUM CHLORIDE RB-82 1 DOSE: 150 INJECTION, SOLUTION INTRAVENOUS at 08:58

## 2021-08-03 RX ADMIN — HEPARIN SODIUM 5000 UNITS: 5000 INJECTION INTRAVENOUS; SUBCUTANEOUS at 09:42

## 2021-08-03 RX ADMIN — AMLODIPINE BESYLATE 2.5 MG: 2.5 TABLET ORAL at 09:42

## 2021-08-03 RX ADMIN — REGADENOSON 0.4 MG: 0.08 INJECTION, SOLUTION INTRAVENOUS at 09:00

## 2021-08-03 NOTE — OUTREACH NOTE
Prep Survey      Responses   [facility] patient discharged from?  Man   Is LACE score < 7 ?  Yes   Emergency Room discharge w/ pulse ox?  No   [facility]   Date of Admission  08/02/21   Date of Discharge  08/03/21   Discharge Disposition  Home or Self Care   Discharge diagnosis  chest pain,  stable angina   Does the patient have one of the following disease processes/diagnoses(primary or secondary)?  Other   Does the patient have Home health ordered?  No   Is there a DME ordered?  No   Prep survey completed?  Yes          Nell Sorto RN

## 2021-08-03 NOTE — DISCHARGE SUMMARY
Date of Discharge:  8/3/2021    Patient Care Team:  Anastacio Bhakta MD as PCP - General  Anastacio Bhakta MD as PCP - Family Medicine    Discharge Diagnosis: Chest pain: Resolved    Presenting Problem  Stable angina (CMS/[facility]) [I20.8]    Allergies   Allergen Reactions   • Iodinated Diagnostic Agents Anaphylaxis       Discharge Medications     Discharge Medications      New Medications      Instructions Start Date   albuterol sulfate  (90 Base) MCG/ACT inhaler  Commonly known as: PROVENTIL HFA;VENTOLIN HFA;PROAIR HFA   2 puffs, Inhalation, Every 4 Hours PRN      amLODIPine 2.5 MG tablet  Commonly known as: NORVASC   2.5 mg, Oral, Every 24 Hours Scheduled   Start Date: August 4, 2021     nitroglycerin 0.4 MG SL tablet  Commonly known as: NITROSTAT   0.4 mg, Sublingual, Every 5 Minutes PRN, Take no more than 3 doses in 15 minutes.         Continue These Medications      Instructions Start Date   ondansetron ODT 4 MG disintegrating tablet  Commonly known as: ZOFRAN-ODT   4-8 mg, Oral, Every 6 Hours PRN      oxyCODONE-acetaminophen 5-325 MG per tablet  Commonly known as: PERCOCET   1 tablet, Oral, Every 4 Hours PRN      tamsulosin 0.4 MG capsule 24 hr capsule  Commonly known as: FLOMAX   0.4 mg, Oral, Nightly             Procedures Performed  · Continual telemetry monitoring  · Echocardiogram 8/03/2021:  · Estimated left ventricular EF = 55% Estimated left ventricular EF was in agreement with the calculated left ventricular EF. Left ventricular ejection fraction appears to be 51 - 55%. Left ventricular systolic function is normal.  · Left ventricular diastolic function was normal.  · Estimated right ventricular systolic pressure from tricuspid regurgitation is normal (<35 mmHg).  · Normal right ventricular cavity size, wall thickness, systolic function and septal motion noted.  · No evidence of pulmonary hypertension is present.  · There is no evidence of pericardial effusion.  · No significant  structural or functional valvular abnormalities demonstrated   Cardiac PET 8/03/2021:  · Patient denied chest discomfort with the IV Lexiscan.  · No significant ST or T wave changes noted; no ectopy.  · There is no prior study available for comparison. REST: 48% EF STRESS: 49%.  · GI artifact is present.  · Left ventricular ejection fraction is borderline normal (calculated EF = 49%); WNL TID = 1.04.  · Myocardial perfusion imaging indicates a normal myocardial perfusion study with no evidence of ischemia.  · Impressions are consistent with a low risk study.  · Findings consistent with a normal pharmacologic ECG stress test.  · Qualitative review of the thoracic CT scan slices does not demonstrate marked epicardial coronary artery calcification.  · WNL room oximetry before, during, and after pharmacologic stress (97-98%).  · Acceptable negative IV Lexiscan hybrid PET cardiac scan myocardial perfusion study suggestive of low probability for significant focal obstructive coronary artery disease with normal calculated coronary artery flow reserved and preserved systolic left ventricular function (LVEF 0.49).  · Chest x-ray 8/2/2021:No evidence of acute disease in the chest  · ECG x3    History of Present Illness  This is a 45-year-old  male who presented to [facility] ED 8/2/2021 with complaints of retrosternal stabbing chest pain that radiates to his left axilla and neck with concomitant shortness of breath and intermittent episodes of diaphoresis.  He denied any dizziness, nausea, vomiting, presyncope, syncope, abdominal discomfort, or palpitations when he had his chest pain.  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  He denies any eliciting factors/chronicity of chest pain.  However, he states that this pain has been  present and waxing and waning in nature ever since Saturday over the past 48 hours and rest improves his symptoms.  He has not had any Covid exposure that he is aware of.  He has not been vaccinated but does get testing every Tuesday at the [employer].  The patient's brother is a longstanding diabetic who has an ICD but there are no other family members with known CAD.  The patient himself denies any cardiac history nor any medical history beyond a history of rectal bleeding, right knee surgery, and a possible diagnosis of hypertension.  He is not on any anti-hypertensive medications at home and denies history of any elevated cholesterol levels or diabetes.  The patient denies any history of stress tests, heart catheterizations, MIs, CVAs, TIAs, rheumatic fever, DVTs, PEs, GERD, asthma, arrhythmias, or tobacco use.  He is significantly concerned for underlying cardiac etiology since he has had symptoms for the past few days. ER evaluation at this time reveals a normal x-ray absent of acute cardiopulmonary process, normal troponins x2, EKG without acute changes (no previous EKG to compare to), and normal lab work except slightly elevated hemoglobin/hematocrit.    Cardiovascular Disease Risk Factors  family history, male gender    Hospital Course  Patient is a 45 y.o.  male who presented to [facility] ED 8/2/2021 with complaints of chest pain and shortness of breath.  He was admitted for observation overnight and had an acceptable stress test and echocardiogram with no evidence of ischemia, normal EF, no valvular or structural abnormalities.  Troponins were negative x3, ECG with no ST-T changes, negative chest x-ray, and unremarkable labs.  He was started on amlodipine 2.5 mg daily and we will give him access to nitroglycerin sublingual as needed at discharge.  He was discharged home 8/3/2021 in stable condition with follow-up appointments listed below.    Labs  Results from last 7 days   Lab Units 08/03/21  0570    SODIUM mmol/L 136   POTASSIUM mmol/L 4.0   CHLORIDE mmol/L 104   CO2 mmol/L 22.0   BUN mg/dL 17   CREATININE mg/dL 0.95   GLUCOSE mg/dL 123*   CALCIUM mg/dL 8.8     Results from last 7 days   Lab Units 08/02/21  1101   WBC 10*3/mm3 6.91   HEMOGLOBIN g/dL 18.3*   HEMATOCRIT % 51.3*   PLATELETS 10*3/mm3 303     Results from last 7 days   Lab Units 08/03/21  0531   CHOLESTEROL mg/dL 162   TRIGLYCERIDES mg/dL 144   HDL CHOL mg/dL 37*   LDL CHOL mg/dL 99         Results from last 7 days   Lab Units 08/03/21  0531 08/02/21  1324 08/02/21  1101   TROPONIN T ng/mL <0.010 <0.010 <0.010       Vital Signs  Temp:  [97.7 °F (36.5 °C)-98.2 °F (36.8 °C)] 97.9 °F (36.6 °C)  Heart Rate:  [53-73] 70  Resp:  [16] 16  BP: (119-142)/(70-90) 136/82  Temp  Min: 97.7 °F (36.5 °C)  Max: 98.2 °F (36.8 °C)   BP  Min: 119/74  Max: 142/90   Pulse  Min: 53  Max: 73   Resp  Min: 16  Max: 16   SpO2  Min: 96 %  Max: 100 %      Weight  Min: 81.6 kg (180 lb)  Max: 81.6 kg (180 lb)     Discharge Disposition: Stable  Home or Self Care    Discharge Diet: Cardiac    Activity at Discharge: As tolerated    Follow-up Appointments  Future Appointments   Date Time Provider Department Center   8/6/2021 10:30 AM Robinson [facility]allpa APRN [facility]     Additional Instructions for the Follow-ups that You Need to Schedule     Discharge Follow-up with Specified Provider: Dr. Hester/DMITRIY Sherman; 6 Weeks   As directed      To: Dr. Hester/DMITRIY Sherman    Follow Up: 6 Weeks               Test Results Pending at Discharge: None      Electronically signed by DMITRIY Sprague, 08/03/21, 1:19 PM EDT.    I have seen and examined the patient, case was discussed with the physician extender, reviewed the above note, necessary changes were made and I agree with the final note.     Kevin Hester MD FACC

## 2021-08-03 NOTE — SIGNIFICANT NOTE
Patient IV/ tele discontinued, discharge teaching provided and written materials given.  Patient's family here to transport.

## 2021-08-03 NOTE — PROGRESS NOTES
"Panda Garcia  [id number]  1975   LOS: 0 days   Patient Care Team:  Anastacio Bhakta MD as PCP - General  Anastacio Bhakta MD as PCP - Family Medicine    Chief Complaint:  DISABLING ATYPICAL CP / LABILE HTN    Subjective     Generally comfortable in bed this morning off supplemental oxygen therapy.  States his chest pain has largely resolved but he still has \"chest tightness;\" he denies cough, sputum production, pleurisy, hemoptysis, nausea, emesis, abdominal pain, headache, or focal motor-sensory changes.    Objective     Vital Sign Min/Max for last 24 hours  Temp  Min: 97.7 °F (36.5 °C)  Max: 98.2 °F (36.8 °C)   BP  Min: 119/74  Max: 162/96   Pulse  Min: 53  Max: 75   Resp  Min: 16  Max: 24   SpO2  Min: 96 %  Max: 100 %      Weight  Min: 81.6 kg (180 lb)  Max: 81.6 kg (180 lb)         08/02/21  1044   Weight: 81.6 kg (180 lb)         Intake/Output Summary (Last 24 hours) at 8/3/2021 0717  Last data filed at 8/2/2021 1700  Gross per 24 hour   Intake 100 ml   Output --   Net 100 ml       Physical Exam:     General Appearance:    Alert, cooperative, in no acute distress   Lungs:     Clear to auscultation,respirations regular, even and                   unlabored    Heart:    Regular and normal rate, normal S1 and S2, no            murmur, no gallop, no rub, no click   Abdomen:  Extremities:   Soft, non-tender, bowel sounds audible x4    No edema, normal range of motion   Pulses:   Pulses palpable and equal bilaterally      Results Review:   Results from last 7 days   Lab Units 08/02/21  1101   SODIUM mmol/L 136   POTASSIUM mmol/L 3.8   CHLORIDE mmol/L 102   CO2 mmol/L 22.0   BUN mg/dL 15   CREATININE mg/dL 0.96   GLUCOSE mg/dL 122*   CALCIUM mg/dL 9.8     Results from last 7 days   Lab Units 08/02/21  1101   WBC 10*3/mm3 6.91   HEMOGLOBIN g/dL 18.3*   HEMATOCRIT % 51.3*   PLATELETS 10*3/mm3 303     Results from last 7 days   Lab Units 08/03/21  0531 08/02/21  1324 08/02/21  1101   TROPONIN T " ng/mL <0.010 <0.010 <0.010     · TROPONIN: (-) X 3    · EKG:    Normal sinus rhythm  Normal ECG  When compared with ECG of 02-AUG-2021 13:32,  Non-specific change in ST segment in Inferior leads  ST elevation now present in Lateral leads  T wave inversion now evident in Inferior leads    Medication Review: REVIEWED; NO DRIPS.    Assessment/Plan     Disabling chest pain syndrome of uncertain etiology.  Doubt esophageal spasm or symptomatic cholelithiasis.  For echocardiogram and PET myocardial perfusion study this morning in view of his history of contrast allergy.  No FLP to review this morning.      Stable angina (CMS/HCC)    08/03/21  07:17 EDT

## 2021-08-03 NOTE — PLAN OF CARE
Pt vss, on RA, NSR on tele. Pt still had chest pain of 8 at start of shift. Two PRN nitro tablets administered, pt reported relief. Pt NPO at midnight. Pt resting with no complaints at this time.

## 2021-08-03 NOTE — CASE MANAGEMENT/SOCIAL WORK
Case Management Discharge Note      Final Note: Patient has orders for discharge.  Spoke with patient at bedside and advised that PCP appt has been arranged for him on Friday.  Patient indicates he is scheduled off of work and can make that appt.  No other needs verbalized.  Patient plan is to discharge home today via car with family to transort.    Provided Post Acute Provider List?: N/A  N/A Provider List Comment: denies need    Selected Continued Care - Admitted Since 8/2/2021     Destination    No services have been selected for the patient.              Durable Medical Equipment    No services have been selected for the patient.              Dialysis/Infusion    No services have been selected for the patient.              Home Medical Care    No services have been selected for the patient.              Therapy    No services have been selected for the patient.              Community Resources    No services have been selected for the patient.              Community & DME    No services have been selected for the patient.                       Final Discharge Disposition Code: 01 - home or self-care

## 2021-08-03 NOTE — CASE MANAGEMENT/SOCIAL WORK
Discharge Planning Assessment  [facility]     Patient Name: Panda Garcia  MRN: [id number]  Today's Date: 8/3/2021    Admit Date: 8/2/2021    Discharge Needs Assessment     Row Name 08/03/21 1301       Living Environment    Lives With  spouse    Name(s) of Who Lives With Patient  Amelia Garcia    Current Living Arrangements  home/apartment/condo    Primary Care Provided by  self    Provides Primary Care For  no one    Family Caregiver if Needed  spouse    Family Caregiver Names  Amelia Garcia    Quality of Family Relationships  involved;helpful;supportive    Able to Return to Prior Arrangements  yes       Resource/Environmental Concerns    Resource/Environmental Concerns  none       Transition Planning    Patient/Family Anticipates Transition to  home    Patient/Family Anticipated Services at Transition  none    Transportation Anticipated  family or friend will provide       Discharge Needs Assessment    Readmission Within the Last 30 Days  no previous admission in last 30 days    Equipment Currently Used at Home  none    Concerns to be Addressed  no discharge needs identified;denies needs/concerns at this time    Anticipated Changes Related to Illness  none    Equipment Needed After Discharge  none    Provided Post Acute Provider List?  N/A    N/A Provider List Comment  denies need    Discharge Coordination/Progress  Home        Discharge Plan     Row Name 08/03/21 1302       Plan    Plan  Home    Patient/Family in Agreement with Plan  yes    Plan Comments  Spoke with patient at bedside regarding discharge planning.  Patient denies use or need for HH or DME and reports that he has prescription coverage but currently is not taking any medications regularly.  He lives with his wife in a multilevel house with his bedroom upstairs and denies concerns regarding home safety.  He reprots he did not receive the COVID shot.  Patient requests a new PCP within the [facility].  No other discharge needs  verbalized.  Called the [facility] Connection [facility] and spoke to Myrtle who has scheduled patient to see DMITRIY Godfrey to establish primary care on Friday 8/6 at 10:30a.m.  CM following.  Patient plan is to discharge home via car with family to transport.    Final Discharge Disposition Code  01 - home or self-care        Continued Care and Services - Admitted Since 8/2/2021    Coordination has not been started for this encounter.       Expected Discharge Date and Time     Expected Discharge Date Expected Discharge Time    Aug 6, 2021         Demographic Summary     Row Name 08/03/21 1300       General Information    Admission Type  observation    Arrived From  home    Referral Source  admission list    Reason for Consult  discharge planning    Preferred Language  English     Used During This Interaction  no    General Information Comments  none       Contact Information    Permission Granted to Share Info With      Contact Information Obtained for      Contact Information Comments  Amelia Garcia, spouse  [phone]        Functional Status     Row Name 08/03/21 1300       Functional Status    Usual Activity Tolerance  excellent    Current Activity Tolerance  good       Functional Status, IADL    Medications  independent    Meal Preparation  independent    Housekeeping  independent    Laundry  independent    Shopping  independent       Employment/    Employment/ Comments  [employer]        Psychosocial    No documentation.       Abuse/Neglect    No documentation.       Legal    No documentation.       Substance Abuse    No documentation.       Patient Forms    No documentation.           Nieves Turner, JERRI

## 2021-08-04 ENCOUNTER — TRANSITIONAL CARE MANAGEMENT TELEPHONE ENCOUNTER (OUTPATIENT)
Dept: CALL CENTER | Facility: HOSPITAL | Age: 46
End: 2021-08-04

## 2021-08-04 NOTE — OUTREACH NOTE
Call Center TCM Note      Responses   [facility] patient discharged from?  [place]   Does the patient have one of the following disease processes/diagnoses(primary or secondary)?  Other   TCM attempt successful?  Yes   Call start time  0932   Call end time  0933   Discharge diagnosis  chest pain,  stable angina   Is patient permission given to speak with other caregiver?  Yes   List who call center can speak with  spouse- Amelia   Person spoke with today (if not patient) and relationship  spouse- Amelia   Does the patient have all medications ordered at discharge?  Yes   Is the patient taking all medications as directed (includes completed medication regime)?  Yes   Does the patient have a primary care provider?   Yes   Does the patient have an appointment with their PCP within 7 days of discharge?  Yes   Comments regarding PCP  new patient appt with DMITRIY Paula on 8/6   Has the patient kept scheduled appointments due by today?  N/A   Has home health visited the patient within 72 hours of discharge?  N/A   Psychosocial issues?  No   Did the patient receive a copy of their discharge instructions?  Yes   Nursing interventions  Reviewed instructions with patient [with spouse]   What is the patient's perception of their health status since discharge?  Improving   Is the patient/caregiver able to teach back the hierarchy of who to call/visit for symptoms/problems? PCP, Specialist, Home health nurse, Urgent Care, ED, 911  Yes   TCM call completed?  Yes   Wrap up additional comments  Per spouse, patient is doing well, no questions at this time, confirmed new patient appt for 8/6.          Rhianna Roa RN    8/4/2021, 09:34 EDT

## 2021-08-09 ENCOUNTER — OFFICE VISIT (OUTPATIENT)
Dept: INTERNAL MEDICINE | Facility: CLINIC | Age: 46
End: 2021-08-09

## 2021-08-09 VITALS
HEIGHT: 72 IN | SYSTOLIC BLOOD PRESSURE: 158 MMHG | TEMPERATURE: 98 F | OXYGEN SATURATION: 96 % | BODY MASS INDEX: 23.57 KG/M2 | WEIGHT: 174 LBS | HEART RATE: 77 BPM | DIASTOLIC BLOOD PRESSURE: 90 MMHG

## 2021-08-09 DIAGNOSIS — J45.20 MILD INTERMITTENT ASTHMA, UNSPECIFIED WHETHER COMPLICATED: ICD-10-CM

## 2021-08-09 DIAGNOSIS — F41.9 ANXIETY: ICD-10-CM

## 2021-08-09 DIAGNOSIS — I10 ESSENTIAL HYPERTENSION: Primary | ICD-10-CM

## 2021-08-09 PROCEDURE — 1111F DSCHRG MED/CURRENT MED MERGE: CPT

## 2021-08-09 PROCEDURE — 99495 TRANSJ CARE MGMT MOD F2F 14D: CPT

## 2021-08-09 RX ORDER — AMLODIPINE BESYLATE 2.5 MG/1
5 TABLET ORAL
Qty: 90 TABLET | Refills: 1 | Status: SHIPPED | OUTPATIENT
Start: 2021-08-09 | End: 2021-09-20 | Stop reason: SDUPTHER

## 2021-08-09 NOTE — PROGRESS NOTES
Chief Complaint  Establish Care (Follow up visit from ED, chest pain )    Panda Garcia presents to [facility]    The patient is here to establish care and management of acute and chronic conditions.  Prior primary care provider: No PCP for past 3 years.      Works as a  at [employer].     HPI: Presented to [facility] ED on 8/2/2021 with complaints of chest pain and shortness of breath.  He was admitted for observation overnight and had an acceptable stress test and echocardiogram with no evidence of ischemia, normal EF, no valvular or structural abnormalities.  Troponins were negative x3, ECG with no ST-T changes, negative chest x-ray, and unremarkable labs.  He was started on amlodipine 2.5 mg daily and prn nitroglycerin sublingual as needed. He was discharged home 8/3/2021. Since discharge patient has been taking amlodipine (2.5 mg) each day with no omitted doses. No side effects reported. Takes blood pressure sporadically at home with readings in 130s over 90s. No monitoring Na in diet. Breakfast is usually an english muffin with peanut butter. Eats rice and chicken for lunch. Dinner is usually a cooked meal. Does not eat out often. Does mountain biking 2-3 times per week. Has not had to used NTG since discharge. Did feel tightness in the mid-sternum of his chest on Saturday night and reports he was having an increase in anxiety. No triggers identifiable. No reports of panic episodes.  with 3 kids and reports they provide a strong social support system for him. Tried counseling in the past and stop going because they started him on Seroquel and he didn't like the side effects. Not interested in counseling at this time. Has not tried any other medications. Interested in trying medications today. Not sleeping well at night as it takes him a long time to fall asleep due to mind racing. Sleeps 5-6 hours and does not always feel rested upon awakening. Has  been working 12 hour shift 5-6 days per week and feels like there sometimes isn't enough time in the day. No current suicidal ideations. Has a scheduled follow-up appointment with DMITRIY Huizar @  cardiology on September 23.       DEACON-7= 18    PHQ-9-0     CONNIE-Was dx with asthma as a child but does not regularly use albuterol inhaler. Has not had to use the inhaler since discharge.  No reports of cough or wheezing.     Subjective         Health Maintenance   Topic Date Due   • ANNUAL PHYSICAL  Never done   • HEPATITIS C SCREENING  Never done   • TDAP/TD VACCINES (1 - Tdap) 08/09/2021 (Originally 12/12/1995)   • COVID-19 Vaccine (1) 08/11/2021 (Originally [birth year]/12/1988)   • Pneumococcal Vaccine 0-64 (1 of 2 - PPSV23) 08/09/2022 (Originally 12/12/1982)   • INFLUENZA VACCINE  10/01/2021       [facility]  The following portions of the patient's history were reviewed and updated as appropriate: allergies, current medications, past family history, past medical history, past social history, past surgical history and problem list.     Past Medical History:   Diagnosis Date   • Hypertension       Allergies   Allergen Reactions   • Iodinated Diagnostic Agents Anaphylaxis      Social History     Tobacco Use   • Smoking status: Former Smoker   • Smokeless tobacco: Former User   Substance Use Topics   • Alcohol use: Yes     Comment: occasional   • Drug use: No     Past Surgical History:   Procedure Laterality Date   • KNEE SURGERY        Family History   Family history unknown: Yes         Current Outpatient Medications:   •  albuterol sulfate  (90 Base) MCG/ACT inhaler, Inhale 2 puffs Every 4 (Four) Hours As Needed for Wheezing., Disp: 6.7 g, Rfl: 0  •  amLODIPine (NORVASC) 2.5 MG tablet, Take 2 tablets by mouth Daily., Disp: 90 tablet, Rfl: 1  •  nitroglycerin (NITROSTAT) 0.4 MG SL tablet, Place 1 tablet under the tongue Every 5 (Five) Minutes As Needed for Chest Pain. Take no more than 3 doses in 15 minutes., Disp: 25  "tablet, Rfl: 12  •  sertraline (ZOLOFT) 50 MG tablet, Take 1 tablet by mouth Daily. Take 1/2 tablet for the first week and increase to 1 tablet (50 mg per day) as tolerated., Disp: 30 tablet, Rfl: 0    Review of Systems   Constitutional: Negative for activity change, appetite change, chills, diaphoresis, fatigue, fever, unexpected weight gain and unexpected weight loss.   Respiratory: Positive for chest tightness. Negative for apnea, cough, choking, shortness of breath, wheezing and stridor.    Cardiovascular: Negative for chest pain, palpitations and leg swelling.   Gastrointestinal: Negative for abdominal distention, abdominal pain, anal bleeding, blood in stool, constipation, diarrhea, nausea, rectal pain, vomiting, GERD and indigestion.   Skin: Negative for color change, dry skin, pallor, rash, skin lesions and bruise.   Allergic/Immunologic: Negative for environmental allergies, food allergies and immunocompromised state.   Neurological: Negative for dizziness, tremors, seizures, syncope, facial asymmetry, speech difficulty, weakness, light-headedness, numbness, headache, memory problem and confusion.   Psychiatric/Behavioral: Positive for sleep disturbance. Negative for agitation, behavioral problems, decreased concentration, dysphoric mood, hallucinations, self-injury, suicidal ideas, negative for hyperactivity, depressed mood and stress. The patient is nervous/anxious.        Objective   Vital Signs  /90 (BP Location: Left arm)   Pulse 77   Temp 98 °F (36.7 °C)   Ht 182.9 cm (72\")   Wt 78.9 kg (174 lb)   SpO2 96%   BMI 23.60 kg/m²     Physical Exam  Constitutional:       Appearance: Normal appearance. He is normal weight.   HENT:      Head: Normocephalic.      Nose: Nose normal.      Mouth/Throat:      Mouth: Mucous membranes are moist.      Pharynx: Oropharynx is clear.   Eyes:      Extraocular Movements: Extraocular movements intact.      Conjunctiva/sclera: Conjunctivae normal.      Pupils: " Pupils are equal, round, and reactive to light.   Cardiovascular:      Rate and Rhythm: Normal rate and regular rhythm.      Pulses: Normal pulses.      Heart sounds: S2 normal.   Pulmonary:      Effort: Pulmonary effort is normal.      Breath sounds: Normal breath sounds and air entry.   Abdominal:      General: Abdomen is flat.   Musculoskeletal:         General: Normal range of motion.      Cervical back: Normal range of motion.   Skin:     General: Skin is warm and dry.      Capillary Refill: Capillary refill takes less than 2 seconds.   Neurological:      General: No focal deficit present.      Mental Status: He is alert. Mental status is at baseline.   Psychiatric:         Attention and Perception: Attention normal.         Mood and Affect: Mood is anxious. Mood is not depressed or elated. Affect is not labile, blunt, flat, angry, tearful or inappropriate.         Behavior: Behavior is not agitated, slowed, aggressive, withdrawn, hyperactive or combative.         Thought Content: Thought content is not paranoid or delusional. Thought content does not include homicidal or suicidal ideation. Thought content does not include homicidal or suicidal plan.         Judgment: Judgment is not impulsive or inappropriate.          Result Review :     The following data was reviewed by: DMITRIY Zaidi on 08/09/2021:  Common labs    Common Labsle 8/2/21 8/2/21 8/3/21 8/3/21    1101 1101 0531 0531   Glucose  122 (A) 123 (A)    BUN  15 17    Creatinine  0.96 0.95    eGFR Non African Am  85 86    Sodium  136 136    Potassium  3.8 4.0    Chloride  102 104    Calcium  9.8 8.8    Albumin  4.50     Total Bilirubin  0.5     Alkaline Phosphatase  72     AST (SGOT)  22     ALT (SGPT)  22     WBC 6.91      Hemoglobin 18.3 (A)      Hematocrit 51.3 (A)      Platelets 303      Total Cholesterol    162   Triglycerides    144   HDL Cholesterol    37 (A)   LDL Cholesterol     99   (A) Abnormal value       Comments are available for some  flowsheets but are not being displayed.           Data reviewed: Cardiology studies & ED note       Assessment and Plan    1. Essential hypertension  - Hemoglobin A1c; Future  - amLODIPine (NORVASC) 2.5 MG tablet; Take 2 tablets by mouth Daily.  Dispense: 90 tablet; Refill: 1  - Uncontrolled. Increase amlodipine to 5 mg per day.   - Encouraged to bring a BP log to the follow-up visit. Obtain 2 readings per day with 1 upon first awakening and 1 in the evening.   - Goal is blood pressure less than 130/80.  -Encouraged to consume a healthy diet such as the DASH diet which is rich in fruits, vegetables, whole grains, poultry, fish, nuts, and low-fat dairy products, with reduced content of saturated fat (13 g maximum), sugar-sweetened beverages, red meats, and total fat.  -Encouraged to limit sodium intake to a maximum of 2400 mg/day and if possible 1500 mg/day for the best benefit in blood pressure reduction.   - Encouraged to participate in 150 mins per week (30 mins per day 5 days per week) of moderate intensity aerobic exercise or 75 mins per week of vigorous aerobic activity to aid in blood pressure reduction.  - Encouraged to avoid using NSAIDs, decongestants, stimulants, weight loss drugs (stimulants), or any illicit drugs (amphetamines and cocaine).   - Encouraged to please call into the office or seek emergency care if systolic blood pressure sustaining above 180 and/or diastolic over 120 and/or experiencing symptoms such as headache, dizziness, altered mental status, shortness of breath, chest pain, decreased urine output, vomiting, or changes in vision.   2. Anxiety  - TSH Rfx On Abnormal To Free T4; Future  - sertraline (ZOLOFT) 50 MG tablet; Take 1 tablet by mouth Daily. Take 1/2 tablet (25 mg) per day for the first week and increase to 1 tablet (50 mg per day) as tolerated.  Dispense: 30 tablet; Refill: 0  - Uncontrolled. Initiate sertraline as prescribed. Take 1/2 tablet (25 mg) for the first week and then  increase to 1 tablet (50 mg) per day as tolerated.  -Counseled patient regarding multimodal approach with healthy nutrition, healthy sleep, regular physical activity, social activities, counseling, and medications.    -Reviewed medication options and common side effects.   -Advised some antidepression meds can take several weeks (usually around 4 weeks) to see improvement in symptoms, and importance in taking medication as prescribed.    -Advised some antidepressants especially in younger populations can worsen depression symptoms.   -Patient advised to seek urgent follow-up in office or emergency medical attention should they develop thoughts of harm to self or others. Patients verbalizes understanding and agreement with plan of care.     3. Mild intermittent asthma, unspecified whether complicated  - Prn albuterol inhaler  -Controlled. Continue with prn albuterol inhaler.        Follow Up     Return in about 4 weeks (around 9/6/2021) for HTN follow-up and Anxiety follow-up .    Patient was given instructions and counseling regarding his condition or for health maintenance advice. Please see specific information pulled into the AVS if appropriate.    Part of this note may be an electronic transcription/translation of spoken language to printed text using the Dragon Dictation System.    Electronically signed by:   DMITRIY Zaidi  08/09/2021

## 2021-09-08 DIAGNOSIS — F41.9 ANXIETY: ICD-10-CM

## 2021-09-08 NOTE — TELEPHONE ENCOUNTER
Patient only has 3 pills left and was told not to come off this medication. His next appointment is on 9/23. He wants to know if he can get enough to last until his appointment or if he needs to move is appointment up.    Please advise patient isn't reachable until after 5 pm

## 2021-09-09 NOTE — TELEPHONE ENCOUNTER
Last Office Visit: 08/09/21  Next Office Visit:09/20/21    Labs completed in past 6 months? yes  Labs completed in past year? yes    Last Refill Date:08/09/21  Quantity:30  Refills:0    Pharmacy:     Please review pended refill request for any changes needed on refills or quantities. Thank you!

## 2021-09-15 NOTE — PROGRESS NOTES
Subjective:     Encounter Date:09/23/2021      Patient ID: Panda Garcia is a 45 y.o.   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    PHYSICIAN: Anastacio Bhakta MD     Chief Complaint:   Chief Complaint   Patient presents with   • Chest Pain     f/u     Problem List:  1. Aggressive disabling chest pain syndrome  a. [facility] ED 8/2/2021 with negative troponin x2, acceptable chest x-ray and EKG with no acute ST-T changes, proBNP and d dimer WNL  b. CCS class II chest pain/NYHA class I-II dyspnea on exertion symptoms  c. Cardiac PET 8/3/2021: Acceptable negative IV Lexiscan hybrid PET cardiac scan myocardial perfusion study suggestive of low probability for significant focal obstructive CAD with normal calculated coronary artery flow reserve and preserved systolic LV function (LVEF 0.49)  d. Echocardiogram 8/3/2021: LVEF 55%, RVSP less than 35 mmHg, normal RV cavity size, wall thickness, systolic function and septal motion noted.  No significant structural or functional valvular abnormalities demonstrated.  e. Residual class I symptoms  2. Elevated blood pressure without the diagnosis of hypertension  3. Hyperlipidemia; ASCVD 10-year risk is 2.5%, 1% if treated  4. Family History of CAD-brother has an ICD  5. H/O Rectal bleeding  6. Remote arthroscopic right knee surgery     Allergies   Allergen Reactions   • Iodinated Diagnostic Agents Anaphylaxis         Current Outpatient Medications:   •  albuterol sulfate  (90 Base) MCG/ACT inhaler, Inhale 2 puffs Every 4 (Four) Hours As Needed for Wheezing., Disp: 6.7 g, Rfl: 0  •  amLODIPine (NORVASC) 10 MG tablet, Take 1 tablet by mouth Daily., Disp: 90 tablet, Rfl: 1  •  busPIRone (BUSPAR) 5 MG tablet, Take 1 tablet by mouth 3 (Three) Times a Day for 30 days., Disp: 90 tablet, Rfl: 0  •  nitroglycerin (NITROSTAT) 0.4 MG SL tablet, Place 1 tablet under the tongue Every 5 (Five) Minutes As Needed for Chest Pain. Take no more than 3 doses in  "15 minutes., Disp: 25 tablet, Rfl: 12  •  sertraline (ZOLOFT) 50 MG tablet, Take 1 tablet by mouth Daily., Disp: 30 tablet, Rfl: 0    HISTORY OF PRESENT ILLNESS:  The patient is here for a 6 week follow up after overnight observation for stable angina and had an acceptable stress test and echocardiogram August 2021.  The patient has not had any chest pain, shortness of breath, palpitations, dizziness, presyncope, or syncope.  He tries to not eat much salt in his diet.  He wonders if some of his chest discomfort he was having before was due to anxiety.  He started Zoloft and has been on it for about a month now.  He has nitroglycerin sublingual to use but has not had to use any doses.  He saw his physician earlier this week and his amlodipine was increased to 10 mg daily.  The patient can monitor his blood pressure at home and will call in 1-1.5 weeks with his blood pressure readings.      Review of Systems   All other systems reviewed and are negative.     All other systems reviewed and otherwise negative.    Procedures       Objective:       Vitals:    09/23/21 1055 09/23/21 1056   BP: 148/97 155/96   BP Location: Left arm Left arm   Patient Position: Sitting Standing   Pulse: 84 88   Weight: 91.4 kg (201 lb 6.4 oz)    Height: 185.4 cm (73\")    Recheck blood pressure right arm sitting was 138/66  Body mass index is 26.57 kg/m².  Last weight August 2021 was 174 pounds  Constitutional:       Appearance: Healthy appearance. Not in distress.   Neck:      Vascular: No JVR. JVD normal.   Pulmonary:      Effort: Pulmonary effort is normal.      Breath sounds: Normal breath sounds. No wheezing. No rhonchi. No rales.   Chest:      Chest wall: Not tender to palpatation.   Cardiovascular:      PMI at left midclavicular line. Normal rate. Regular rhythm. Normal S1. Normal S2.      Murmurs: There is no murmur.      No gallop. No click. No rub.   Pulses:     Dorsalis pedis: 2+ bilaterally.     Posterior tibial: 2+ " bilaterally.  Edema:     Peripheral edema absent.   Abdominal:      General: Bowel sounds are normal.      Palpations: Abdomen is soft.      Tenderness: There is no abdominal tenderness.   Musculoskeletal: Normal range of motion.         General: No tenderness. Skin:     General: Skin is warm and dry.   Neurological:      General: No focal deficit present.      Mental Status: Alert and oriented to person, place and time.           Lab Review:   Lab Results   Component Value Date    GLUCOSE 123 (H) 08/03/2021    BUN 17 08/03/2021    CREATININE 0.95 08/03/2021    EGFRIFNONA 86 08/03/2021    BCR 17.9 08/03/2021    CO2 22.0 08/03/2021    CALCIUM 8.8 08/03/2021    ALBUMIN 4.50 08/02/2021    AST 22 08/02/2021    ALT 22 08/02/2021       Lab Results   Component Value Date    WBC 6.91 08/02/2021    HGB 18.3 (H) 08/02/2021    HCT 51.3 (H) 08/02/2021    MCV 84.8 08/02/2021     08/02/2021         Lab Results   Component Value Date    CHOL 162 08/03/2021     Lab Results   Component Value Date    TRIG 144 08/03/2021     Lab Results   Component Value Date    HDL 37 (L) 08/03/2021     Lab Results   Component Value Date    LDL 99 08/03/2021         Assessment:       Overall continued acceptable course with no new interim cardiopulmonary complaints with acceptable functional status. We will defer additional diagnostic or therapeutic intervention from a cardiac perspective at this time.  The patient had an acceptable stress test and echocardiogram August 2021.  He had a recent increase this week in his amlodipine to 10 mg daily and will continue to monitor his blood pressure at home and call in 1-1.5 weeks with his blood pressure readings.  May need to add ramipril 2.5 mg nightly if blood pressures are consistently greater than 140 systolic.     Diagnosis Plan   1. Stable angina (CMS/HCC)  No recurrent angina pectoris or CHF on current activity schedule; continue current treatment   2. Essential hypertension  He had a recent  increase this week in his amlodipine to 10 mg daily and will continue to monitor his blood pressure at home and call in 1-1.5 weeks with his blood pressure readings.  May need to add ramipril 2.5 mg nightly if blood pressures are consistently greater than 140 systolic.     3. Hyperlipidemia LDL goal <100  Acceptable lipid panel August 2021, ASCVD 10-year risk is 2.5%, 1% if treated          Plan:         1. Patient to continue current medications and close follow up with the above providers.  2. Tentative cardiology follow up in March 2022 or patient may return sooner PRN.  3. He had a recent increase this week in his amlodipine to 10 mg daily and will continue to monitor his blood pressure at home and call in 1-1.5 weeks with his blood pressure readings.  May need to add ramipril 2.5 mg nightly if blood pressures are consistently greater than 140 systolic.    Electronically signed by DMITRIY Sprague, 09/23/21, 12:20 PM EDT.

## 2021-09-16 PROBLEM — E78.5 HYPERLIPIDEMIA LDL GOAL <100: Status: ACTIVE | Noted: 2021-09-16

## 2021-09-20 ENCOUNTER — OFFICE VISIT (OUTPATIENT)
Dept: INTERNAL MEDICINE | Facility: CLINIC | Age: 46
End: 2021-09-20

## 2021-09-20 VITALS
TEMPERATURE: 98.3 F | WEIGHT: 198 LBS | DIASTOLIC BLOOD PRESSURE: 80 MMHG | OXYGEN SATURATION: 96 % | HEART RATE: 96 BPM | HEIGHT: 72 IN | BODY MASS INDEX: 26.82 KG/M2 | SYSTOLIC BLOOD PRESSURE: 138 MMHG

## 2021-09-20 DIAGNOSIS — F41.9 ANXIETY: ICD-10-CM

## 2021-09-20 DIAGNOSIS — Z11.59 ENCOUNTER FOR HEPATITIS C SCREENING TEST FOR LOW RISK PATIENT: Primary | ICD-10-CM

## 2021-09-20 DIAGNOSIS — I10 ESSENTIAL HYPERTENSION: ICD-10-CM

## 2021-09-20 PROCEDURE — 99214 OFFICE O/P EST MOD 30 MIN: CPT

## 2021-09-20 RX ORDER — AMLODIPINE BESYLATE 10 MG/1
10 TABLET ORAL DAILY
Qty: 90 TABLET | Refills: 1 | Status: SHIPPED | OUTPATIENT
Start: 2021-09-20 | End: 2021-12-17 | Stop reason: SDUPTHER

## 2021-09-20 RX ORDER — BUSPIRONE HYDROCHLORIDE 5 MG/1
5 TABLET ORAL 3 TIMES DAILY
Qty: 90 TABLET | Refills: 0 | Status: SHIPPED | OUTPATIENT
Start: 2021-09-20 | End: 2021-10-22 | Stop reason: SDUPTHER

## 2021-09-20 NOTE — PROGRESS NOTES
Chief Complaint  Anxiety (Follow up, pt states has gotten better, having trouble sleeping since starting new med and having issues w/ libido) and Hypertension    Panda Garcia presents to [facility]    HTN- Taking amlodipine each day with no omitted doses and no side effects reported. Has been taking blood pressure at home with systolic's in the 130's and diastolic's in the 80's. Has been reducing salt in the diet. No regular physical activity outside of work. Not had to use NTG. No headaches, chest pains, dizziness, peripheral edema, visual changes, light-headedness, fatigue, PND, SOA, or IC reported.       Anxiety- Has been taking Zoloft each day with no omitted doses. Does report some decreased libido. No other side effects reported. Reports being able to fall asleep easily but states that he wakes up between 0100 and 0300 occassionally but feels well rested. Usually goes to sleep around 8 PM and is required to report to work at 0500.  with 3 kids and reports that his family provides a strong social support system for him. No panic episodes reported. States that work is still the main cause for his anxiety. Still working 75+ hours per week. States that he must continue to do so for at least the next 10 years so that he can pay for college for his children. No current suicidal ideations. Not interested in counseling at this time.      DEACON-7  Over the last two weeks, how often have you been bothered by the following problems?  Feeling nervous, anxious or on edge: Nearly every day  Not being able to stop or control worrying: Several days  Worrying too much about different things: Nearly every day  Trouble Relaxing: Nearly every day  Being so restless that it is hard to sit still: More than half the days  Becoming easily annoyed or irritable: Not at all  Feeling afraid as if something awful might happen: Not at all  DEACON 7 Total Score: 12  If you checked any problems,  how difficult have these problems made it for you to do your work, take care of things at home, or get along with other people: Somewhat difficult    Previous DEACON-18     Subjective         Health Maintenance   Topic   • ANNUAL PHYSICAL    • HEPATITIS C SCREENING    • TDAP/TD VACCINES (1 - Tdap)   • COVID-19 Vaccine (1)   • Pneumococcal Vaccine 0-64 (1 of 2 - PPSV23)   • INFLUENZA VACCINE    • LIPID PANEL        [facility]  The following portions of the patient's history were reviewed and updated as appropriate: allergies, current medications, past family history, past medical history, past social history, past surgical history and problem list.     Past Medical History:   Diagnosis Date   • Hypertension       Allergies   Allergen Reactions   • Iodinated Diagnostic Agents Anaphylaxis      Social History     Tobacco Use   • Smoking status: Former Smoker   • Smokeless tobacco: Former User   Substance Use Topics   • Alcohol use: Yes     Comment: occasional   • Drug use: No     Past Surgical History:   Procedure Laterality Date   • KNEE SURGERY        Family History   Family history unknown: Yes         Current Outpatient Medications:   •  albuterol sulfate  (90 Base) MCG/ACT inhaler, Inhale 2 puffs Every 4 (Four) Hours As Needed for Wheezing., Disp: 6.7 g, Rfl: 0  •  amLODIPine (NORVASC) 10 MG tablet, Take 1 tablet by mouth Daily., Disp: 90 tablet, Rfl: 1  •  nitroglycerin (NITROSTAT) 0.4 MG SL tablet, Place 1 tablet under the tongue Every 5 (Five) Minutes As Needed for Chest Pain. Take no more than 3 doses in 15 minutes., Disp: 25 tablet, Rfl: 12  •  sertraline (ZOLOFT) 50 MG tablet, Take 1 tablet by mouth Daily., Disp: 30 tablet, Rfl: 0  •  busPIRone (BUSPAR) 5 MG tablet, Take 1 tablet by mouth 3 (Three) Times a Day for 30 days., Disp: 90 tablet, Rfl: 0    Review of Systems   Constitutional: Negative for activity change and fatigue.   Eyes: Negative for blurred vision, double vision and visual disturbance.  "  Respiratory: Negative for apnea, cough, choking, chest tightness, shortness of breath, wheezing and stridor.    Cardiovascular: Negative for chest pain, palpitations and leg swelling.   Genitourinary: Positive for decreased libido. Negative for erectile dysfunction.   Neurological: Negative for dizziness, weakness, light-headedness and headache.   Psychiatric/Behavioral: Positive for sleep disturbance and stress. Negative for agitation, behavioral problems, decreased concentration, dysphoric mood, hallucinations, self-injury, suicidal ideas, negative for hyperactivity and depressed mood. The patient is nervous/anxious.        Objective   Vital Signs  /80   Pulse 96   Temp 98.3 °F (36.8 °C)   Ht 182.9 cm (72\")   Wt 89.8 kg (198 lb)   SpO2 96%   BMI 26.85 kg/m²     Physical Exam  Constitutional:       Appearance: Normal appearance.   Eyes:      Extraocular Movements: Extraocular movements intact.      Conjunctiva/sclera: Conjunctivae normal.      Pupils: Pupils are equal, round, and reactive to light.   Cardiovascular:      Rate and Rhythm: Normal rate and regular rhythm.      Pulses: Normal pulses.      Heart sounds: Normal heart sounds. No friction rub.   Pulmonary:      Effort: Pulmonary effort is normal.   Skin:     General: Skin is warm and dry.      Capillary Refill: Capillary refill takes less than 2 seconds.   Neurological:      Mental Status: He is alert and oriented to person, place, and time.   Psychiatric:         Attention and Perception: Attention and perception normal. He is attentive. He does not perceive auditory or visual hallucinations.         Mood and Affect: Mood is anxious.         Speech: Speech normal.         Behavior: Behavior normal.         Thought Content: Thought content normal.         Cognition and Memory: Cognition and memory normal.         Judgment: Judgment normal.          Result Review     The following data was reviewed by: DMITRIY Zaidi on " 09/20/2021:    Assessment and Plan    1. Anxiety  - busPIRone (BUSPAR) 5 MG tablet; Take 1 tablet by mouth 3 (Three) Times a Day for 30 days.  Dispense: 90 tablet; Refill: 0  - sertraline (ZOLOFT) 50 MG tablet; Take 1 tablet by mouth Daily.  Dispense: 30 tablet; Refill: 0  - Uncontrolled. Continue with Zoloft as prescribed and initiate Buspar. Buspar prescribed to treat anxiety and to counteract sexual side effects likely r/t SSRI.   - Counseled patient regarding multimodal approach with healthy nutrition, healthy sleep, regular physical activity, social activities, counseling, and medications.    - Advised some meds can take several weeks (usually around 4 weeks) to see improvement in symptoms, and importance in taking medication as prescribed.   - Patient advised to seek urgent follow-up in office or emergency medical attention should they develop thoughts of harm to self or others. Patients verbalizes understanding and agreement with plan of care.     2. Essential hypertension  - amLODIPine (NORVASC) 10 MG tablet; Take 1 tablet by mouth Daily.  Dispense: 90 tablet; Refill: 1  - Uncontrolled. Increase amlodipine to 10 mg per day (previously 5 mg per day).   -Continue monitoring blood pressure at home and compile a blood pressure diary to bring to the follow-up visit. Obtain 2 readings per day with 1 upon first awakening and 1 in the evening.   - Goal is blood pressure less than 130/80.  - Encouraged to limit sodium intake to a maximum of 2400 mg/day and if possible 1500 mg/day for the best benefit in blood pressure reduction.   - Encouraged to avoid using NSAIDs, decongestants, stimulants, weight loss drugs (stimulants), or any illicit drugs (amphetamines and cocaine).   - Please call into the office or seek emergency care if systolic blood pressure sustaining above 180 and/or diastolic over 120 and/or experiencing symptoms such as headache, dizziness, altered mental status, shortness of breath, chest pain,  decreased urine output, vomiting, or changes in vision.     3. Encounter for hepatitis C screening test for low risk patient  - Hepatitis C Antibody; Future  - Initial screening for patient who has never been screened.         Follow Up     Return in 4 weeks (on 10/18/2021) for HTN and Anxiety. .    Patient was given instructions and counseling regarding his condition or for health maintenance advice. Please see specific information pulled into the AVS if appropriate.    Part of this note may be an electronic transcription/translation of spoken language to printed text using the Dragon Dictation System.    Electronically signed by:   DMITRIY Zaidi  09/20/2021

## 2021-09-23 ENCOUNTER — OFFICE VISIT (OUTPATIENT)
Dept: CARDIOLOGY | Facility: CLINIC | Age: 46
End: 2021-09-23

## 2021-09-23 VITALS
HEIGHT: 73 IN | BODY MASS INDEX: 26.69 KG/M2 | WEIGHT: 201.4 LBS | DIASTOLIC BLOOD PRESSURE: 96 MMHG | HEART RATE: 88 BPM | SYSTOLIC BLOOD PRESSURE: 155 MMHG

## 2021-09-23 DIAGNOSIS — I10 ESSENTIAL HYPERTENSION: ICD-10-CM

## 2021-09-23 DIAGNOSIS — I20.8 STABLE ANGINA (HCC): Primary | ICD-10-CM

## 2021-09-23 DIAGNOSIS — E78.5 HYPERLIPIDEMIA LDL GOAL <100: ICD-10-CM

## 2021-09-23 PROCEDURE — 99214 OFFICE O/P EST MOD 30 MIN: CPT | Performed by: NURSE PRACTITIONER

## 2021-09-30 ENCOUNTER — OFFICE VISIT (OUTPATIENT)
Dept: INTERNAL MEDICINE | Facility: CLINIC | Age: 46
End: 2021-09-30

## 2021-09-30 VITALS
DIASTOLIC BLOOD PRESSURE: 94 MMHG | BODY MASS INDEX: 27.09 KG/M2 | OXYGEN SATURATION: 98 % | TEMPERATURE: 98.5 F | WEIGHT: 200 LBS | SYSTOLIC BLOOD PRESSURE: 156 MMHG | HEART RATE: 95 BPM | HEIGHT: 72 IN

## 2021-09-30 DIAGNOSIS — J01.00 ACUTE MAXILLARY SINUSITIS, RECURRENCE NOT SPECIFIED: Primary | ICD-10-CM

## 2021-09-30 PROCEDURE — 99213 OFFICE O/P EST LOW 20 MIN: CPT

## 2021-09-30 RX ORDER — FLUTICASONE PROPIONATE 50 MCG
2 SPRAY, SUSPENSION (ML) NASAL DAILY
Qty: 16 G | Refills: 2 | Status: SHIPPED | OUTPATIENT
Start: 2021-09-30

## 2021-09-30 RX ORDER — AMOXICILLIN AND CLAVULANATE POTASSIUM 875; 125 MG/1; MG/1
1 TABLET, FILM COATED ORAL 2 TIMES DAILY
Qty: 14 TABLET | Refills: 0 | Status: SHIPPED | OUTPATIENT
Start: 2021-09-30 | End: 2021-10-07

## 2021-10-06 RX ORDER — RAMIPRIL 2.5 MG/1
2.5 CAPSULE ORAL NIGHTLY
Qty: 30 CAPSULE | Refills: 5 | Status: SHIPPED | OUTPATIENT
Start: 2021-10-06 | End: 2021-11-05

## 2021-10-11 DIAGNOSIS — F41.9 ANXIETY: ICD-10-CM

## 2021-10-22 ENCOUNTER — OFFICE VISIT (OUTPATIENT)
Dept: INTERNAL MEDICINE | Facility: CLINIC | Age: 46
End: 2021-10-22

## 2021-10-22 VITALS
HEIGHT: 72 IN | DIASTOLIC BLOOD PRESSURE: 98 MMHG | SYSTOLIC BLOOD PRESSURE: 150 MMHG | BODY MASS INDEX: 26.68 KG/M2 | HEART RATE: 76 BPM | OXYGEN SATURATION: 99 % | WEIGHT: 197 LBS

## 2021-10-22 DIAGNOSIS — F41.9 ANXIETY: ICD-10-CM

## 2021-10-22 DIAGNOSIS — I10 ESSENTIAL HYPERTENSION: Primary | ICD-10-CM

## 2021-10-22 PROCEDURE — 99214 OFFICE O/P EST MOD 30 MIN: CPT

## 2021-10-22 RX ORDER — ESCITALOPRAM OXALATE 20 MG/1
20 TABLET ORAL DAILY
Qty: 30 TABLET | Refills: 0 | Status: SHIPPED | OUTPATIENT
Start: 2021-10-22 | End: 2021-11-05 | Stop reason: ALTCHOICE

## 2021-10-22 RX ORDER — CHLORTHALIDONE 25 MG/1
25 TABLET ORAL DAILY
Qty: 30 TABLET | Refills: 0 | Status: SHIPPED | OUTPATIENT
Start: 2021-10-22 | End: 2021-11-05

## 2021-10-22 RX ORDER — BUSPIRONE HYDROCHLORIDE 10 MG/1
10 TABLET ORAL 3 TIMES DAILY
Qty: 90 TABLET | Refills: 0 | Status: SHIPPED | OUTPATIENT
Start: 2021-10-22 | End: 2021-11-05 | Stop reason: SDUPTHER

## 2021-10-22 NOTE — PROGRESS NOTES
Chief Complaint  Hypertension (Follow up, 4 weeks, pt states not being able to sleep well still) and Anxiety    Panda Garcia presents to [facility]    Anxiety- Taking sertraline and buspar each day with no omitted doses. Still experiencing decreased libido with since starting sertraline even with the add-on of Buspar. Able to fall asleep but is unable to stay asleep. Feels well-rested upon awakening but gets tired before the end of the work-day. Has tried taking melatonin each day with some improvement in symptoms. Going to sleep at nine and reports waking up around 1-2 am each day.     DEACON-7  Over the last two weeks, how often have you been bothered by the following problems?  Feeling nervous, anxious or on edge: Several days  Not being able to stop or control worrying: Several days  Worrying too much about different things: More than half the days  Trouble Relaxing: Nearly every day  Being so restless that it is hard to sit still: Nearly every day  Becoming easily annoyed or irritable: Not at all  Feeling afraid as if something awful might happen: Not at all  DEACON 7 Total Score: 10  If you checked any problems, how difficult have these problems made it for you to do your work, take care of things at home, or get along with other people: Somewhat difficult    PHQ-2\PHQ-9 Score:  0    HTN- Taking ramipril and amlodipine each day with no omitted doses and no side effects reported. Has been taking blood pressure at home with systolics in the 140's-160's and diastolic's in the 90's. Monitoring Na in the diet. Working out 2 hours per day. Currently asymptomatic.       Subjective           PMS[facility]  The following portions of the patient's history were reviewed and updated as appropriate: allergies, current medications, past family history, past medical history, past social history, past surgical history and problem list.     Past Medical History:   Diagnosis Date   • Hypertension        Allergies   Allergen Reactions   • Iodinated Diagnostic Agents Anaphylaxis      Social History     Tobacco Use   • Smoking status: Former Smoker   • Smokeless tobacco: Former User   Substance Use Topics   • Alcohol use: Yes     Comment: occasional   • Drug use: No     Past Surgical History:   Procedure Laterality Date   • KNEE SURGERY        Family History   Family history unknown: Yes         Current Outpatient Medications:   •  albuterol sulfate  (90 Base) MCG/ACT inhaler, Inhale 2 puffs Every 4 (Four) Hours As Needed for Wheezing., Disp: 6.7 g, Rfl: 0  •  amLODIPine (NORVASC) 10 MG tablet, Take 1 tablet by mouth Daily., Disp: 90 tablet, Rfl: 1  •  fluticasone (Flonase) 50 MCG/ACT nasal spray, 2 sprays into the nostril(s) as directed by provider Daily., Disp: 16 g, Rfl: 2  •  nitroglycerin (NITROSTAT) 0.4 MG SL tablet, Place 1 tablet under the tongue Every 5 (Five) Minutes As Needed for Chest Pain. Take no more than 3 doses in 15 minutes., Disp: 25 tablet, Rfl: 12  •  ramipril (Altace) 2.5 MG capsule, Take 1 capsule by mouth Every Night., Disp: 30 capsule, Rfl: 5  •  busPIRone (BUSPAR) 10 MG tablet, Take 1 tablet by mouth 3 (Three) Times a Day for 30 days., Disp: 90 tablet, Rfl: 0  •  chlorthalidone (HYGROTON) 25 MG tablet, Take 1 tablet by mouth Daily., Disp: 30 tablet, Rfl: 0  •  escitalopram (Lexapro) 20 MG tablet, Take 1 tablet by mouth Daily for 30 days. Take 1/2 tablet for 7 days, then increase to 1 tablet per day as tolerated., Disp: 30 tablet, Rfl: 0    Review of Systems   Respiratory: Negative for apnea, cough, choking, chest tightness, shortness of breath, wheezing and stridor.    Cardiovascular: Negative for chest pain, palpitations and leg swelling.   Genitourinary: Negative for breast discharge, decreased libido, decreased urine volume, difficulty urinating, discharge, dysuria, flank pain, frequency, genital sores, hematuria, nocturia, penile pain, erectile dysfunction, penile swelling,  "scrotal swelling, testicular pain, urgency and urinary incontinence.   Psychiatric/Behavioral: Positive for sleep disturbance and stress. Negative for agitation, behavioral problems, decreased concentration, dysphoric mood, hallucinations, self-injury, suicidal ideas, negative for hyperactivity and depressed mood. The patient is nervous/anxious.        Objective   Vital Signs  /98   Pulse 76   Ht 182.9 cm (72\")   Wt 89.4 kg (197 lb)   SpO2 99%   BMI 26.72 kg/m²     Physical Exam  Constitutional:       Appearance: Normal appearance.   Eyes:      Extraocular Movements: Extraocular movements intact.      Conjunctiva/sclera: Conjunctivae normal.      Pupils: Pupils are equal, round, and reactive to light.   Cardiovascular:      Rate and Rhythm: Normal rate and regular rhythm.   Pulmonary:      Effort: Pulmonary effort is normal.      Breath sounds: Normal breath sounds.   Abdominal:      General: Bowel sounds are normal.      Palpations: Abdomen is soft.   Skin:     General: Skin is warm and dry.      Capillary Refill: Capillary refill takes less than 2 seconds.   Neurological:      Mental Status: He is alert and oriented to person, place, and time.   Psychiatric:         Attention and Perception: Attention and perception normal.         Mood and Affect: Mood is anxious.         Speech: Speech normal.         Behavior: Behavior normal.         Thought Content: Thought content normal.         Cognition and Memory: Cognition and memory normal.         Judgment: Judgment normal.          Result Review :     The following data was reviewed by: DMITRIY Zaidi on 10/22/2021:      Assessment and Plan    1. Anxiety  - escitalopram (Lexapro) 20 MG tablet; Take 1 tablet by mouth Daily for 30 days. Take 1/2 tablet for 7 days, then increase to 1 tablet per day as tolerated.  Dispense: 30 tablet; Refill: 0  - busPIRone (BUSPAR) 10 MG tablet; Take 1 tablet by mouth 3 (Three) Times a Day for 30 days.  Dispense: 90 " tablet; Refill: 0  - Uncontrolled. D/C sertraline and initiate lexapro while increasing buspirone. Will evaluate for improvement in decreased libido at the follow-up visit.   -Counseled patient regarding multimodal approach with healthy nutrition, healthy sleep, regular physical activity, social activities, counseling, and medications. Reviewed medication options and common side effects.  - Patient advised to seek urgent follow-up in office or emergency medical attention should they develop thoughts of harm to self or others.   - Patients verbalizes understanding and agreement with plan of care.     2. Essential hypertension  - chlorthalidone (HYGROTON) 25 MG tablet; Take 1 tablet by mouth Daily.  Dispense: 30 tablet; Refill: 0  - Comprehensive Metabolic Panel; Future  - Uncontrolled. Will add on chlorthalidone and increase ramipril if still uncontrolled. Will follow-up in 2 weeks to evaluate for improvement in blood pressure.   - Encouraged to continue reducing Na intake to less than 2g/day.   - With sleep disturbance and difficult to control BP will consider a sleep study if patient is agreeable.     Follow Up     Return in about 2 weeks (around 11/5/2021).    Patient was given instructions and counseling regarding his condition or for health maintenance advice. Please see specific information pulled into the AVS if appropriate.    Part of this note may be an electronic transcription/translation of spoken language to printed text using the Dragon Dictation System.    Electronically signed by:   DMITRIY Zaidi  10/22/2021

## 2021-11-05 ENCOUNTER — OFFICE VISIT (OUTPATIENT)
Dept: INTERNAL MEDICINE | Facility: CLINIC | Age: 46
End: 2021-11-05

## 2021-11-05 VITALS
SYSTOLIC BLOOD PRESSURE: 128 MMHG | DIASTOLIC BLOOD PRESSURE: 82 MMHG | WEIGHT: 196 LBS | OXYGEN SATURATION: 98 % | HEART RATE: 82 BPM | HEIGHT: 72 IN | BODY MASS INDEX: 26.55 KG/M2

## 2021-11-05 DIAGNOSIS — F41.9 ANXIETY: ICD-10-CM

## 2021-11-05 DIAGNOSIS — I10 ESSENTIAL HYPERTENSION: ICD-10-CM

## 2021-11-05 PROCEDURE — 99214 OFFICE O/P EST MOD 30 MIN: CPT

## 2021-11-05 RX ORDER — CHLORTHALIDONE 25 MG/1
25 TABLET ORAL DAILY
Qty: 90 TABLET | Refills: 1 | Status: SHIPPED | OUTPATIENT
Start: 2021-11-05 | End: 2021-12-17 | Stop reason: SDUPTHER

## 2021-11-05 RX ORDER — ESCITALOPRAM OXALATE 20 MG/1
20 TABLET ORAL DAILY
Qty: 90 TABLET | Refills: 1 | Status: SHIPPED | OUTPATIENT
Start: 2021-11-05 | End: 2021-12-17 | Stop reason: SDUPTHER

## 2021-11-05 RX ORDER — BUSPIRONE HYDROCHLORIDE 10 MG/1
10 TABLET ORAL 3 TIMES DAILY
Qty: 90 TABLET | Refills: 5 | Status: SHIPPED | OUTPATIENT
Start: 2021-11-05 | End: 2021-12-17 | Stop reason: SDUPTHER

## 2021-11-05 RX ORDER — CHLORTHALIDONE 25 MG/1
25 TABLET ORAL DAILY
Qty: 30 TABLET | Refills: 0 | Status: SHIPPED | OUTPATIENT
Start: 2021-11-05 | End: 2021-11-05

## 2021-11-05 RX ORDER — RAMIPRIL 5 MG/1
5 CAPSULE ORAL NIGHTLY
Qty: 30 CAPSULE | Refills: 5 | Status: SHIPPED | OUTPATIENT
Start: 2021-11-05 | End: 2021-12-17 | Stop reason: SDUPTHER

## 2021-11-05 NOTE — PROGRESS NOTES
Chief Complaint  Hypertension (Follow up)    Panda Garcia presents to [facility]    HTN- Taking amlodipine, ramipril, and chlorthalidone each day with no omitted doses and no side effects reported. Monitoring blood pressure at home with systolic's in the 140's and diastolic's in the 90's. Monitoring Na in the diet. Doing row exercises 4-5 days per week. Currently asymptomatic.     Anx/Dep- Taking lexapro and Buspar each day with no omitted doses and no side effects reported. States that he feels symptoms are much improved. Still noticing a decreased libido. Has a good social support system in his wife and kids.     DEACON-7  Over the last two weeks, how often have you been bothered by the following problems?  Feeling nervous, anxious or on edge: Several days  Not being able to stop or control worrying: Not at all  Worrying too much about different things: Not at all  Trouble Relaxing: Nearly every day  Being so restless that it is hard to sit still: Not at all  Becoming easily annoyed or irritable: Not at all  Feeling afraid as if something awful might happen: Not at all  DEACON 7 Total Score: 4  If you checked any problems, how difficult have these problems made it for you to do your work, take care of things at home, or get along with other people: Somewhat difficult      Subjective         [facility]  The following portions of the patient's history were reviewed and updated as appropriate: allergies, current medications, past family history, past medical history, past social history, past surgical history and problem list.     Past Medical History:   Diagnosis Date   • Hypertension       Allergies   Allergen Reactions   • Iodinated Diagnostic Agents Anaphylaxis      Social History     Tobacco Use   • Smoking status: Former Smoker   • Smokeless tobacco: Former User   Substance Use Topics   • Alcohol use: Yes     Comment: occasional   • Drug use: No     Past Surgical History:    Procedure Laterality Date   • KNEE SURGERY        Family History   Family history unknown: Yes         Current Outpatient Medications:   •  albuterol sulfate  (90 Base) MCG/ACT inhaler, Inhale 2 puffs Every 4 (Four) Hours As Needed for Wheezing., Disp: 6.7 g, Rfl: 0  •  amLODIPine (NORVASC) 10 MG tablet, Take 1 tablet by mouth Daily., Disp: 90 tablet, Rfl: 1  •  busPIRone (BUSPAR) 10 MG tablet, Take 1 tablet by mouth 3 (Three) Times a Day for 180 days., Disp: 90 tablet, Rfl: 5  •  chlorthalidone (HYGROTON) 25 MG tablet, Take 1 tablet by mouth Daily., Disp: 90 tablet, Rfl: 1  •  escitalopram (Lexapro) 20 MG tablet, Take 1 tablet by mouth Daily for 180 days., Disp: 90 tablet, Rfl: 1  •  fluticasone (Flonase) 50 MCG/ACT nasal spray, 2 sprays into the nostril(s) as directed by provider Daily., Disp: 16 g, Rfl: 2  •  nitroglycerin (NITROSTAT) 0.4 MG SL tablet, Place 1 tablet under the tongue Every 5 (Five) Minutes As Needed for Chest Pain. Take no more than 3 doses in 15 minutes., Disp: 25 tablet, Rfl: 12  •  ramipril (Altace) 5 MG capsule, Take 1 capsule by mouth Every Night., Disp: 30 capsule, Rfl: 5    Review of Systems   Constitutional: Negative for activity change, appetite change, fatigue and fever.   Respiratory: Negative for apnea, cough, choking, chest tightness, shortness of breath, wheezing and stridor.    Cardiovascular: Negative for chest pain, palpitations and leg swelling.   Gastrointestinal: Negative for abdominal distention, abdominal pain, anal bleeding, blood in stool, constipation, diarrhea, nausea, rectal pain, vomiting, GERD and indigestion.   Genitourinary: Negative for breast discharge, decreased libido, decreased urine volume, difficulty urinating, discharge, dysuria, flank pain, frequency, genital sores, hematuria, nocturia, penile pain, erectile dysfunction, penile swelling, scrotal swelling, testicular pain, urgency and urinary incontinence.   Skin: Negative for color change, dry  "skin, pallor, rash, skin lesions and bruise.   Psychiatric/Behavioral: Negative for agitation, behavioral problems, decreased concentration, dysphoric mood, hallucinations, self-injury, sleep disturbance, suicidal ideas, negative for hyperactivity, depressed mood and stress. The patient is not nervous/anxious.        Objective   Vital Signs  /82   Pulse 82   Ht 182.9 cm (72\")   Wt 88.9 kg (196 lb)   SpO2 98%   BMI 26.58 kg/m²     Physical Exam  Constitutional:       Appearance: Normal appearance.   HENT:      Mouth/Throat:      Mouth: Mucous membranes are moist.      Pharynx: Oropharynx is clear.   Eyes:      Conjunctiva/sclera: Conjunctivae normal.      Pupils: Pupils are equal, round, and reactive to light.   Cardiovascular:      Rate and Rhythm: Normal rate and regular rhythm.      Pulses: Normal pulses.      Heart sounds: Normal heart sounds.   Pulmonary:      Effort: Pulmonary effort is normal.      Breath sounds: Normal breath sounds.   Abdominal:      General: Bowel sounds are normal.      Palpations: Abdomen is soft.   Skin:     General: Skin is warm and dry.      Capillary Refill: Capillary refill takes less than 2 seconds.   Neurological:      Mental Status: He is alert and oriented to person, place, and time.   Psychiatric:         Mood and Affect: Mood normal.         Behavior: Behavior normal.         Thought Content: Thought content normal.         Judgment: Judgment normal.          Result Review :     The following data was reviewed by: DMITRIY Zaidi on 11/05/2021:  CMP    CMP 8/2/21 8/3/21   Glucose 122 (A) 123 (A)   BUN 15 17   Creatinine 0.96 0.95   eGFR Non African Am 85 86   Sodium 136 136   Potassium 3.8 4.0   Chloride 102 104   Calcium 9.8 8.8   Albumin 4.50    Total Bilirubin 0.5    Alkaline Phosphatase 72    AST (SGOT) 22    ALT (SGPT) 22    (A) Abnormal value       Comments are available for some flowsheets but are not being displayed.             Assessment and Plan    1. " Essential hypertension  -  ramipril (Altace) 5 MG capsule; Take 1 capsule by mouth Every Night.  Dispense: 30 capsule; Refill: 5  -  chlorthalidone (HYGROTON) 25 MG tablet; Take 1 tablet by mouth Daily.  Dispense: 90 tablet; Refill: 1  -  Uncontrolled. Increase ramipril to 5 mg. Continue with chlorthalidone and amlodipine as prescribed.   -  Encouraged to continue monitoring blood pressure at home and compile a blood pressure diary to bring to the follow-up visit. Obtain 2 readings per day with 1 upon first awakening and 1 in the evening.   - Discussed goal is blood pressure less than 130/80.  - Encouraged to limit Na intake to less than 2g/day   - Encouraged to continue with exercise regimen.   - Encouraged to please call into the office or seek emergency care if systolic blood pressure sustaining above 180 and/or diastolic over 120 and/or experiencing symptoms such as headache, dizziness, altered mental status, shortness of breath, chest pain, decreased urine output, vomiting, or changes in vision.   - Will follow-up in 4 weeks to evaluate for blood pressure control.   2. Anxiety  - escitalopram (Lexapro) 20 MG tablet; Take 1 tablet by mouth Daily for 180 days.  Dispense: 90 tablet; Refill: 1  - busPIRone (BUSPAR) 10 MG tablet; Take 1 tablet by mouth 3 (Three) Times a Day for 180 days.  Dispense: 90 tablet; Refill: 5  - Controlled. Continue with buspirone and lexapro as prescribed.   - Counseled patient regarding multimodal approach with healthy nutrition, healthy sleep, regular physical activity, social activities, counseling, and medications.   - Patient advised to seek urgent follow-up in office or emergency medical attention should they develop thoughts of harm to self or others. Patients verbalizes understanding and agreement with plan of care.       Follow Up     Return in about 4 weeks (around 12/3/2021) for Next scheduled follow up, Annual.    Patient was given instructions and counseling regarding his  condition or for health maintenance advice. Please see specific information pulled into the AVS if appropriate.    Part of this note may be an electronic transcription/translation of spoken language to printed text using the Dragon Dictation System.    Electronically signed by:   DMITRIY Zaidi  11/05/2021

## 2021-12-17 ENCOUNTER — OFFICE VISIT (OUTPATIENT)
Dept: INTERNAL MEDICINE | Facility: CLINIC | Age: 46
End: 2021-12-17

## 2021-12-17 VITALS
SYSTOLIC BLOOD PRESSURE: 122 MMHG | OXYGEN SATURATION: 95 % | HEIGHT: 72 IN | HEART RATE: 77 BPM | WEIGHT: 194 LBS | BODY MASS INDEX: 26.28 KG/M2 | RESPIRATION RATE: 16 BRPM | DIASTOLIC BLOOD PRESSURE: 76 MMHG | TEMPERATURE: 98.7 F

## 2021-12-17 DIAGNOSIS — F41.9 ANXIETY: ICD-10-CM

## 2021-12-17 DIAGNOSIS — G47.9 SLEEP DISTURBANCE: Primary | ICD-10-CM

## 2021-12-17 DIAGNOSIS — K62.5 BRBPR (BRIGHT RED BLOOD PER RECTUM): ICD-10-CM

## 2021-12-17 DIAGNOSIS — I10 ESSENTIAL HYPERTENSION: ICD-10-CM

## 2021-12-17 PROCEDURE — 99214 OFFICE O/P EST MOD 30 MIN: CPT

## 2021-12-17 RX ORDER — HYDROXYZINE PAMOATE 50 MG/1
50 CAPSULE ORAL 3 TIMES DAILY PRN
Qty: 90 CAPSULE | Refills: 1 | Status: SHIPPED | OUTPATIENT
Start: 2021-12-17 | End: 2022-03-24

## 2021-12-17 RX ORDER — ESCITALOPRAM OXALATE 20 MG/1
20 TABLET ORAL DAILY
Qty: 90 TABLET | Refills: 1 | Status: SHIPPED | OUTPATIENT
Start: 2021-12-17 | End: 2022-06-15

## 2021-12-17 RX ORDER — BUSPIRONE HYDROCHLORIDE 10 MG/1
10 TABLET ORAL 3 TIMES DAILY
Qty: 90 TABLET | Refills: 5 | Status: SHIPPED | OUTPATIENT
Start: 2021-12-17 | End: 2022-06-15

## 2021-12-17 RX ORDER — CHLORTHALIDONE 25 MG/1
25 TABLET ORAL DAILY
Qty: 90 TABLET | Refills: 1 | Status: SHIPPED | OUTPATIENT
Start: 2021-12-17

## 2021-12-17 RX ORDER — AMLODIPINE BESYLATE 10 MG/1
10 TABLET ORAL DAILY
Qty: 90 TABLET | Refills: 1 | Status: SHIPPED | OUTPATIENT
Start: 2021-12-17

## 2021-12-17 RX ORDER — RAMIPRIL 5 MG/1
5 CAPSULE ORAL NIGHTLY
Qty: 90 CAPSULE | Refills: 1 | Status: SHIPPED | OUTPATIENT
Start: 2021-12-17 | End: 2022-04-14

## 2021-12-17 NOTE — PROGRESS NOTES
Chief Complaint  Hypertension (Follow up)    Panda Garcia presents to [facility]    HTN- Taking amlodipine, chlorthalidone, and ramipril each day with no omitted doses and no side effects reported. Has not been monitoring blood pressure at home. Monitoring sodium in the diet. Exercising 4-5 days per week on the [employer] machine. Currently asymptomatic.     DEACON- Taking Lexapro and Buspar each day with no omitted doses and no side effects reported. Reports still experiencing decreased libido at times. Feels like it may be due to working all of the time and intermittent stress from work. No current or past suicidal ideations.     DEACON-7  Over the last two weeks, how often have you been bothered by the following problems?  Feeling nervous, anxious or on edge: More than half the days  Not being able to stop or control worrying: Not at all  Worrying too much about different things: Not at all  Trouble Relaxing: More than half the days  Being so restless that it is hard to sit still: Nearly every day  Becoming easily annoyed or irritable: Not at all  Feeling afraid as if something awful might happen: Not at all  DEACON 7 Total Score: 7  If you checked any problems, how difficult have these problems made it for you to do your work, take care of things at home, or get along with other people: Somewhat difficult      Sleep disturbance- Has been sleeping around 4 hours per night. States that it is difficult to fall asleep because his mind is racing. Feels like it is probably because of his work schedule. Feels exhausted when he wakes up. Has tried taking Ambien, trazodone, and Seroquel in the past and reports side effects were too pronounced. Would be interested in trying another medication at this time.     BRBPR- Reports having frequent BRBPR. Reports bright red blood on the toilet paper and in the stool. Occurs with each bowel movement. Experiencines intermittent abdominal pain that is  relieved with defecation. Bowel movements are formed, soft and easy to pass. Having a bowel movement once per day. Abdominal pain is currently a 0 out of 10.     Subjective         Health Maintenance   Topic   • COLORECTAL CANCER SCREENING    • ANNUAL PHYSICAL    • HEPATITIS C SCREENING    • COVID-19 Vaccine (1)   • Pneumococcal Vaccine 0-64 (1 of 2 - PPSV23)   • TDAP/TD VACCINES (1 - Tdap)   • INFLUENZA VACCINE    • LIPID PANEL        [facility]  The following portions of the patient's history were reviewed and updated as appropriate: allergies, current medications, past family history, past medical history, past social history, past surgical history and problem list.     Past Medical History:   Diagnosis Date   • Hypertension       Allergies   Allergen Reactions   • Iodinated Diagnostic Agents Anaphylaxis      Social History     Tobacco Use   • Smoking status: Former Smoker   • Smokeless tobacco: Former User   Substance Use Topics   • Alcohol use: Yes     Comment: occasional   • Drug use: No     Past Surgical History:   Procedure Laterality Date   • KNEE SURGERY        Family History   Family history unknown: Yes         Current Outpatient Medications:   •  albuterol sulfate  (90 Base) MCG/ACT inhaler, Inhale 2 puffs Every 4 (Four) Hours As Needed for Wheezing., Disp: 6.7 g, Rfl: 0  •  amLODIPine (NORVASC) 10 MG tablet, Take 1 tablet by mouth Daily., Disp: 90 tablet, Rfl: 1  •  busPIRone (BUSPAR) 10 MG tablet, Take 1 tablet by mouth 3 (Three) Times a Day for 180 days., Disp: 90 tablet, Rfl: 5  •  chlorthalidone (HYGROTON) 25 MG tablet, Take 1 tablet by mouth Daily., Disp: 90 tablet, Rfl: 1  •  escitalopram (Lexapro) 20 MG tablet, Take 1 tablet by mouth Daily for 180 days., Disp: 90 tablet, Rfl: 1  •  fluticasone (Flonase) 50 MCG/ACT nasal spray, 2 sprays into the nostril(s) as directed by provider Daily., Disp: 16 g, Rfl: 2  •  nitroglycerin (NITROSTAT) 0.4 MG SL tablet, Place 1 tablet under the tongue Every 5  "(Five) Minutes As Needed for Chest Pain. Take no more than 3 doses in 15 minutes., Disp: 25 tablet, Rfl: 12  •  ramipril (Altace) 5 MG capsule, Take 1 capsule by mouth Every Night., Disp: 90 capsule, Rfl: 1  •  hydrOXYzine pamoate (Vistaril) 50 MG capsule, Take 1 capsule by mouth 3 (Three) Times a Day As Needed for Anxiety for up to 90 days., Disp: 90 capsule, Rfl: 1    Review of Systems   Constitutional: Negative for activity change, appetite change, chills, fatigue and fever.   Respiratory: Negative for apnea, cough, choking, chest tightness, shortness of breath, wheezing and stridor.    Cardiovascular: Negative for chest pain, palpitations and leg swelling.   Gastrointestinal: Positive for abdominal pain, anal bleeding and blood in stool. Negative for abdominal distention, constipation, diarrhea, nausea, rectal pain, vomiting, GERD and indigestion.   Psychiatric/Behavioral: Positive for sleep disturbance and stress. Negative for agitation, behavioral problems, decreased concentration, dysphoric mood, hallucinations, self-injury, suicidal ideas, negative for hyperactivity and depressed mood. The patient is nervous/anxious.        Objective   Vital Signs  /76   Pulse 77   Temp 98.7 °F (37.1 °C)   Resp 16   Ht 182.9 cm (72\")   Wt 88 kg (194 lb)   SpO2 95%   BMI 26.31 kg/m²     Physical Exam  Constitutional:       Appearance: Normal appearance.   HENT:      Head: Normocephalic.      Mouth/Throat:      Mouth: Mucous membranes are moist.      Pharynx: Oropharynx is clear.   Eyes:      Conjunctiva/sclera: Conjunctivae normal.      Pupils: Pupils are equal, round, and reactive to light.   Cardiovascular:      Rate and Rhythm: Normal rate and regular rhythm.      Pulses: Normal pulses.      Heart sounds: Normal heart sounds.   Pulmonary:      Effort: Pulmonary effort is normal.      Breath sounds: Normal breath sounds. No stridor.   Abdominal:      General: Bowel sounds are normal.      Palpations: Abdomen is " soft.   Skin:     General: Skin is warm and dry.      Capillary Refill: Capillary refill takes less than 2 seconds.   Neurological:      Mental Status: He is alert and oriented to person, place, and time.   Psychiatric:         Mood and Affect: Mood normal.         Behavior: Behavior normal.         Thought Content: Thought content normal.         Judgment: Judgment normal.          Result Review :     The following data was reviewed by: DMITRIY Zaidi on 12/17/2021:  Common labs    Common Labsle 8/2/21 8/2/21 8/3/21 8/3/21    1101 1101 [id number]   Glucose  122 (A) 123 (A)    BUN  15 17    Creatinine  0.96 0.95    eGFR Non African Am  85 86    Sodium  136 136    Potassium  3.8 4.0    Chloride  102 104    Calcium  9.8 8.8    Albumin  4.50     Total Bilirubin  0.5     Alkaline Phosphatase  72     AST (SGOT)  22     ALT (SGPT)  22     WBC 6.91      Hemoglobin 18.3 (A)      Hematocrit 51.3 (A)      Platelets 303      Total Cholesterol    162   Triglycerides    144   HDL Cholesterol    37 (A)   LDL Cholesterol     99   (A) Abnormal value       Comments are available for some flowsheets but are not being displayed.             Assessment and Plan    1. Essential hypertension  - chlorthalidone (HYGROTON) 25 MG tablet; Take 1 tablet by mouth Daily.  Dispense: 90 tablet; Refill: 1  - ramipril (Altace) 5 MG capsule; Take 1 capsule by mouth Every Night.  Dispense: 90 capsule; Refill: 1  - amLODIPine (NORVASC) 10 MG tablet; Take 1 tablet by mouth Daily.  Dispense: 90 tablet; Refill: 1  - Lipid Panel; Future  - Controlled. Continue with chlorthalidone, ramipril, and amlodipine as prescribed.   - Encouraged to monitor BP at home. Discussed goal blood pressure is less than 130/80.   - Encouraged to continue with current diet and exercise practices.   - Encouraged to please call into the office or seek emergency care if systolic blood pressure sustaining above 180 and/or diastolic over 120 and/or experiencing symptoms such as  headache, dizziness, altered mental status, shortness of breath, chest pain, decreased urine output, vomiting, or changes in vision.     2. Anxiety  - escitalopram (Lexapro) 20 MG tablet; Take 1 tablet by mouth Daily for 180 days.  Dispense: 90 tablet; Refill: 1  - busPIRone (BUSPAR) 10 MG tablet; Take 1 tablet by mouth 3 (Three) Times a Day for 180 days.  Dispense: 90 tablet; Refill: 5  - hydrOXYzine pamoate (Vistaril) 50 MG capsule; Take 1 capsule by mouth 3 (Three) Times a Day As Needed for Anxiety for up to 90 days.  Dispense: 90 capsule; Refill: 1  - Controlled. However, still experiencing intermittent breakthrough anxiety. Will add on vistaril prn for this. Encouraged patient to follow-up in office if these occurrences are not controlled with vistaril.   - Counseled patient regarding multimodal approach with healthy nutrition, healthy sleep, regular physical activity, social activities, counseling, and medications.   - Patient advised to seek urgent follow-up in office or emergency medical attention should they develop thoughts of harm to self or others.   - Patients verbalizes understanding and agreement with plan of care.     3. Sleep disturbance  - hydrOXYzine pamoate (Vistaril) 50 MG capsule; Take 1 capsule by mouth 3 (Three) Times a Day As Needed for Anxiety for up to 90 days.  Dispense: 90 capsule; Refill: 1  - Discussed good sleep hygiene and practices to facilitate restful sleep. Encouraged to follow-up if no improvement with vistaril.     4. BRBPR (bright red blood per rectum  - Ambulatory Referral to Gastroenterology      Follow Up     Return in about 6 months (around 6/17/2022) for HTN and anxiety .    Patient was given instructions and counseling regarding his condition or for health maintenance advice. Please see specific information pulled into the AVS if appropriate.    Part of this note may be an electronic transcription/translation of spoken language to printed text using the Dragon Dictation  System.    Electronically signed by:   Robinson Guallpa, APRN  12/17/2021

## 2022-03-17 NOTE — PROGRESS NOTES
Subjective:     Encounter Date:03/24/2022      Patient ID: Panda Garcia is a 46 y.o.    male from 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     PHYSICIAN: Anastacio Bhakta MD .    Chief Complaint:   Chief Complaint   Patient presents with   • Stable angina     Problem List:  1. Aggressive disabling chest pain syndrome  a. [facility] ED 8/2/2021 with negative troponin x2, acceptable chest x-ray and EKG with no acute ST-T changes, proBNP and d dimer WNL  b. CCS class II chest pain/NYHA class I-II dyspnea on exertion symptoms  c. Cardiac PET 8/3/2021: Acceptable negative IV Lexiscan hybrid PET cardiac scan myocardial perfusion study suggestive of low probability for significant focal obstructive CAD with normal calculated coronary artery flow reserve and preserved systolic LV function (LVEF 0.49)  d. Echocardiogram 8/3/2021: LVEF 55%, RVSP less than 35 mmHg, normal RV cavity size, wall thickness, systolic function and septal motion noted.  No significant structural or functional valvular abnormalities demonstrated.  e. Residual class I symptoms  2. Elevated blood pressure without the diagnosis of hypertension  3. Hyperlipidemia; ASCVD 10-year risk is 2.5%, 1% if treated  4. Family History of CAD-brother has an ICD  5. H/O Rectal bleeding  6. Remote arthroscopic right knee surgery     Allergies   Allergen Reactions   • Iodinated Diagnostic Agents Anaphylaxis       Current Outpatient Medications   Medication Instructions   • albuterol sulfate  (90 Base) MCG/ACT inhaler 2 puffs, Inhalation, Every 4 Hours PRN   • amLODIPine (NORVASC) 10 mg, Oral, Daily   • busPIRone (BUSPAR) 10 mg, Oral, 3 Times Daily   • chlorthalidone (HYGROTON) 25 mg, Oral, Daily   • escitalopram (LEXAPRO) 20 mg, Oral, Daily   • fluticasone (Flonase) 50 MCG/ACT nasal spray 2 sprays, Nasal, Daily   • nitroglycerin (NITROSTAT) 0.4 mg, Sublingual, Every 5 Minutes PRN, Take no more than 3 doses in 15 minutes.   • ramipril (ALTACE)  "5 mg, Oral, Nightly         HISTORY OF PRESENT ILLNESS:  The patient is here for 6-month follow-up.  The patient had an acceptable stress test and echocardiogram August 2021.  He started ramipril October 2021.  He says that his blood pressures have been labile.  He has a wrist blood pressure monitor.  However, he has a cuffed monitor at work that he can use.  He averages about 13 miles of walking during his workday.  He often goes outside to play sports with his children.  He has had a couple episodes of chest pain that lasted for an hour or two with associated nausea and mild shortness of breath.  He denies any palpitations, presyncope, or syncope.  He is unsure whether this is related to anxiety or not.  He has nitroglycerin to use but did not feel that he needed to use it.  He does not like the headache that is associated with nitroglycerin sublingual.  He has not had any recent laboratory testing but will try to get some soon.    ROS   All other systems reviewed and otherwise negative.    Procedures       Objective:       Vitals:    03/24/22 1048 03/24/22 1050   BP: 141/86 146/96   BP Location: Left arm Left arm   Patient Position: Sitting Standing   Pulse: 65 73   SpO2: 98% 98%   Weight: 86.6 kg (191 lb)    Height: 182.9 cm (72\")    Recheck blood pressure left arm sitting was 110/72  Body mass index is 25.9 kg/m².  Last weight September 2021 was 201 pounds  Constitutional:       Appearance: Healthy appearance. Not in distress.   Neck:      Vascular: No JVR. JVD normal.   Pulmonary:      Effort: Pulmonary effort is normal.      Breath sounds: Normal breath sounds. No wheezing. No rhonchi. No rales.   Chest:      Chest wall: Not tender to palpatation.   Cardiovascular:      PMI at left midclavicular line. Normal rate. Regular rhythm. Normal S1. Normal S2.      Murmurs: There is no murmur.      No gallop. No click. No rub.   Pulses:     Dorsalis pedis: 1+ bilaterally.     Posterior tibial: 1+ " bilaterally.  Edema:     Peripheral edema absent.   Abdominal:      General: Bowel sounds are normal.      Palpations: Abdomen is soft.      Tenderness: There is no abdominal tenderness.   Musculoskeletal: Normal range of motion.         General: No tenderness. Skin:     General: Skin is warm and dry.   Neurological:      General: No focal deficit present.      Mental Status: Alert and oriented to person, place and time.           Lab Review:   Lab Results   Component Value Date    GLUCOSE 123 (H) 08/03/2021    BUN 17 08/03/2021    CREATININE 0.95 08/03/2021    EGFRIFNONA 86 08/03/2021    BCR 17.9 08/03/2021    CO2 22.0 08/03/2021    CALCIUM 8.8 08/03/2021    ALBUMIN 4.50 08/02/2021    AST 22 08/02/2021    ALT 22 08/02/2021       Lab Results   Component Value Date    WBC 6.91 08/02/2021    HGB 18.3 (H) 08/02/2021    HCT 51.3 (H) 08/02/2021    MCV 84.8 08/02/2021     08/02/2021         Lab Results   Component Value Date    CHOL 162 08/03/2021     Lab Results   Component Value Date    TRIG 144 08/03/2021     Lab Results   Component Value Date    HDL 37 (L) 08/03/2021     Lab Results   Component Value Date    LDL 99 08/03/2021     Advance Care Planning   ACP discussion was held with the patient during this visit. Patient does not have an advance directive, declines further assistance.         Assessment:       Overall continued acceptable course with no new interim cardiopulmonary complaints with acceptable functional status. We will defer additional diagnostic or therapeutic intervention from a cardiac perspective at this time.  I encouraged the patient to use his nitroglycerin sublingual for recurrent chest pain and to continue monitoring his blood pressure with a cuffed monitor.  He had an acceptable stress test and echocardiogram August 2021 with low probability for significant focal obstructive CAD.      Diagnosis Plan   1. Stable angina (HCC)   He had an acceptable stress test and echocardiogram August  2021 with low probability for significant focal obstructive CAD. Use nitroglycerin sublingual as needed for recurrent chest pain.     2. Essential hypertension   Controlled, continue current cardiac medications   3. Hyperlipidemia LDL goal <100   No new labs to review, continue heart healthy diet and physical activity as tolerated          Plan:         1. Patient to continue current medications and close follow up with the above providers.  2. Tentative cardiology follow up in September 2022 or patient may return sooner PRN.      Electronically signed by DMITRIY Sprague, 03/24/22, 12:16 PM EDT.

## 2022-03-24 ENCOUNTER — OFFICE VISIT (OUTPATIENT)
Dept: CARDIOLOGY | Facility: CLINIC | Age: 47
End: 2022-03-24

## 2022-03-24 VITALS
SYSTOLIC BLOOD PRESSURE: 146 MMHG | BODY MASS INDEX: 25.87 KG/M2 | OXYGEN SATURATION: 98 % | WEIGHT: 191 LBS | HEIGHT: 72 IN | HEART RATE: 73 BPM | DIASTOLIC BLOOD PRESSURE: 96 MMHG

## 2022-03-24 DIAGNOSIS — E78.5 HYPERLIPIDEMIA LDL GOAL <100: ICD-10-CM

## 2022-03-24 DIAGNOSIS — I20.8 STABLE ANGINA: Primary | ICD-10-CM

## 2022-03-24 DIAGNOSIS — I10 ESSENTIAL HYPERTENSION: ICD-10-CM

## 2022-03-24 PROCEDURE — 99214 OFFICE O/P EST MOD 30 MIN: CPT | Performed by: NURSE PRACTITIONER

## 2022-03-30 ENCOUNTER — TELEPHONE (OUTPATIENT)
Dept: INTERNAL MEDICINE | Facility: CLINIC | Age: 47
End: 2022-03-30

## 2022-03-30 NOTE — TELEPHONE ENCOUNTER
Called patient, no voicemail set up. Called to reschedule 6/17 appointment due to provider being out of the office.

## 2022-04-14 ENCOUNTER — OFFICE VISIT (OUTPATIENT)
Dept: INTERNAL MEDICINE | Facility: CLINIC | Age: 47
End: 2022-04-14

## 2022-04-14 VITALS
OXYGEN SATURATION: 97 % | DIASTOLIC BLOOD PRESSURE: 72 MMHG | WEIGHT: 196.4 LBS | BODY MASS INDEX: 26.6 KG/M2 | RESPIRATION RATE: 16 BRPM | TEMPERATURE: 97.1 F | HEART RATE: 69 BPM | SYSTOLIC BLOOD PRESSURE: 138 MMHG | HEIGHT: 72 IN

## 2022-04-14 DIAGNOSIS — R68.82 DECREASED LIBIDO: ICD-10-CM

## 2022-04-14 DIAGNOSIS — I10 ESSENTIAL HYPERTENSION: ICD-10-CM

## 2022-04-14 DIAGNOSIS — H60.313 ACUTE DIFFUSE OTITIS EXTERNA OF BOTH EARS: ICD-10-CM

## 2022-04-14 DIAGNOSIS — Z71.9 HEALTH EDUCATION: Primary | ICD-10-CM

## 2022-04-14 DIAGNOSIS — H66.002 ACUTE SUPPURATIVE OTITIS MEDIA OF LEFT EAR WITHOUT SPONTANEOUS RUPTURE OF TYMPANIC MEMBRANE, RECURRENCE NOT SPECIFIED: ICD-10-CM

## 2022-04-14 LAB
EXPIRATION DATE: NORMAL
HBA1C MFR BLD: 5.6 %
Lab: NORMAL

## 2022-04-14 PROCEDURE — 99214 OFFICE O/P EST MOD 30 MIN: CPT

## 2022-04-14 PROCEDURE — 83036 HEMOGLOBIN GLYCOSYLATED A1C: CPT

## 2022-04-14 RX ORDER — RAMIPRIL 10 MG/1
10 CAPSULE ORAL NIGHTLY
Qty: 30 CAPSULE | Refills: 0 | Status: SHIPPED | OUTPATIENT
Start: 2022-04-14

## 2022-04-14 RX ORDER — CIPROFLOXACIN AND DEXAMETHASONE 3; 1 MG/ML; MG/ML
4 SUSPENSION/ DROPS AURICULAR (OTIC) 2 TIMES DAILY
Qty: 7.5 ML | Refills: 0 | Status: SHIPPED | OUTPATIENT
Start: 2022-04-14 | End: 2022-04-21

## 2022-04-14 RX ORDER — AMOXICILLIN AND CLAVULANATE POTASSIUM 875; 125 MG/1; MG/1
1 TABLET, FILM COATED ORAL 2 TIMES DAILY
Qty: 14 TABLET | Refills: 0 | Status: SHIPPED | OUTPATIENT
Start: 2022-04-14 | End: 2022-04-21

## 2022-04-14 NOTE — PROGRESS NOTES
Chief Complaint  Hypertension (191/108, 196/??,154/92, light headed on Sunday)    Panda Garcia presents to [facility]    Blood type: wanting to know his blood type because his brother is currently on the heart transplant list.      HTN- taking amlodipine, ramipril and chlorthalidone each day with no omitted doses and no side effects reported. States he has been monitoring his BP at home with systolic's in the 150's and diastolic's in the 90's.  States his systolic's were in the 190's today. Feels like he has been light-headed and dizzy over the past bit. Otherwise asymptomatic.     Ear Pain: Reports increased ear pressure and pain with dizziness over the past week. Reports an extensive history of swimmer's ear. Ear is more painful on the left than the right. Has not tried any medications. Pain is rated as a 4 out of 10.     Subjective         Health Maintenance   Topic   • COLORECTAL CANCER SCREENING    • ANNUAL PHYSICAL    • COVID-19 Vaccine (1)   • HEPATITIS C SCREENING    • Pneumococcal Vaccine 0-64 (1 - PCV)   • TDAP/TD VACCINES (1 - Tdap)   • INFLUENZA VACCINE    • LIPID PANEL        [facility]  The following portions of the patient's history were reviewed and updated as appropriate: allergies, current medications, past family history, past medical history, past social history, past surgical history and problem list.     Past Medical History:   Diagnosis Date   • Hypertension       Allergies   Allergen Reactions   • Iodinated Diagnostic Agents Anaphylaxis      Social History     Tobacco Use   • Smoking status: Former Smoker   • Smokeless tobacco: Former User   Vaping Use   • Vaping Use: Never used   Substance Use Topics   • Alcohol use: Yes     Comment: occasional   • Drug use: No     Past Surgical History:   Procedure Laterality Date   • KNEE SURGERY        Family History   Family history unknown: Yes         Current Outpatient Medications:   •  albuterol sulfate   (90 Base) MCG/ACT inhaler, Inhale 2 puffs Every 4 (Four) Hours As Needed for Wheezing., Disp: 6.7 g, Rfl: 0  •  amLODIPine (NORVASC) 10 MG tablet, Take 1 tablet by mouth Daily., Disp: 90 tablet, Rfl: 1  •  busPIRone (BUSPAR) 10 MG tablet, Take 1 tablet by mouth 3 (Three) Times a Day for 180 days., Disp: 90 tablet, Rfl: 5  •  chlorthalidone (HYGROTON) 25 MG tablet, Take 1 tablet by mouth Daily., Disp: 90 tablet, Rfl: 1  •  escitalopram (Lexapro) 20 MG tablet, Take 1 tablet by mouth Daily for 180 days., Disp: 90 tablet, Rfl: 1  •  fluticasone (Flonase) 50 MCG/ACT nasal spray, 2 sprays into the nostril(s) as directed by provider Daily., Disp: 16 g, Rfl: 2  •  nitroglycerin (NITROSTAT) 0.4 MG SL tablet, Place 1 tablet under the tongue Every 5 (Five) Minutes As Needed for Chest Pain. Take no more than 3 doses in 15 minutes., Disp: 25 tablet, Rfl: 12  •  ramipril (Altace) 10 MG capsule, Take 1 capsule by mouth Every Night., Disp: 30 capsule, Rfl: 0  •  amoxicillin-clavulanate (Augmentin) 875-125 MG per tablet, Take 1 tablet by mouth 2 (Two) Times a Day for 7 days., Disp: 14 tablet, Rfl: 0  •  ciprofloxacin-dexamethasone (Ciprodex) 0.3-0.1 % otic suspension, Administer 4 drops into both ears 2 (Two) Times a Day for 7 days., Disp: 7.5 mL, Rfl: 0    Review of Systems   Constitutional: Negative for activity change, appetite change, fatigue and fever.   HENT: Positive for ear discharge and ear pain. Negative for congestion, dental problem, drooling, facial swelling, hearing loss, mouth sores, nosebleeds, postnasal drip, rhinorrhea, sinus pressure, sneezing, sore throat, swollen glands, tinnitus, trouble swallowing and voice change.    Eyes: Negative for blurred vision, double vision, photophobia and visual disturbance.   Respiratory: Negative for apnea, cough, choking, chest tightness, shortness of breath, wheezing and stridor.    Cardiovascular: Negative for chest pain, palpitations and leg swelling.   Allergic/Immunologic:  "Negative for environmental allergies and food allergies.   Neurological: Positive for dizziness and light-headedness. Negative for tremors, seizures, syncope, facial asymmetry, speech difficulty, weakness, numbness, headache, memory problem and confusion.       Objective   Vital Signs  /72   Pulse 69   Temp 97.1 °F (36.2 °C)   Resp 16   Ht 182.9 cm (72.01\")   Wt 89.1 kg (196 lb 6.4 oz)   SpO2 97%   BMI 26.63 kg/m²     Physical Exam  Constitutional:       Appearance: Normal appearance.   HENT:      Right Ear: No decreased hearing noted. Drainage, swelling and tenderness present. No laceration. A middle ear effusion is present. There is no impacted cerumen. No foreign body. No mastoid tenderness. No PE tube. No hemotympanum. Tympanic membrane is not injected, scarred, perforated, erythematous, retracted or bulging. Tympanic membrane has normal mobility.      Left Ear: Decreased hearing noted. Drainage, swelling and tenderness present. No laceration.  No middle ear effusion. There is no impacted cerumen. No foreign body. No mastoid tenderness. No PE tube. No hemotympanum. Tympanic membrane is erythematous and bulging. Tympanic membrane is not injected, scarred, perforated or retracted. Tympanic membrane has decreased mobility.   Cardiovascular:      Rate and Rhythm: Normal rate and regular rhythm.      Pulses: Normal pulses.      Heart sounds: Normal heart sounds.   Pulmonary:      Effort: Pulmonary effort is normal.      Breath sounds: Normal breath sounds.   Abdominal:      General: Bowel sounds are normal.      Palpations: Abdomen is soft.   Skin:     General: Skin is warm and dry.      Capillary Refill: Capillary refill takes less than 2 seconds.   Neurological:      Mental Status: He is alert and oriented to person, place, and time.   Psychiatric:         Mood and Affect: Mood normal.         Behavior: Behavior normal.         Thought Content: Thought content normal.         Judgment: Judgment normal. "          Result Review :     The following data was reviewed by: DMITRIY Zaidi on 04/14/2022:  CMP    CMP 8/2/21 8/3/21   Glucose 122 (A) 123 (A)   BUN 15 17   Creatinine 0.96 0.95   eGFR Non African Am 85 86   Sodium 136 136   Potassium 3.8 4.0   Chloride 102 104   Calcium 9.8 8.8   Albumin 4.50    Total Bilirubin 0.5    Alkaline Phosphatase 72    AST (SGOT) 22    ALT (SGPT) 22    (A) Abnormal value       Comments are available for some flowsheets but are not being displayed.             Assessment and Plan    1. Health education  - Type & Screen; Future  - POC Glycosylated Hemoglobin (Hb A1C)    2. Essential hypertension  - ramipril (Altace) 10 MG capsule; Take 1 capsule by mouth Every Night.  Dispense: 30 capsule; Refill: 0  - Uncontrolled based on home readings and in office but in office BP is much more controlled than reported pressures. Will increase ramipril to 10 mg and continue with amlodipine and chlorthalidone as prescribed. Encouraged to monitor BP at home. Discussed goal was less than 130/80. Encouraged to reduce Na intake to 1500 mg/day or less. Will follow-up in around 4 weeks to evaluate for improvement. Encouraged to please call into the office or seek emergency care if systolic blood pressure sustaining above 180 and/or diastolic over 120 and/or experiencing symptoms such as headache, dizziness, altered mental status, shortness of breath, chest pain, decreased urine output, vomiting, or changes in vision.       3. Decreased libido  - Testosterone (Free & Total), LC / MS; Future    4. Acute diffuse otitis externa of both ears  - ciprofloxacin-dexamethasone (Ciprodex) 0.3-0.1 % otic suspension; Administer 4 drops into both ears 2 (Two) Times a Day for 7 days.  Dispense: 7.5 mL; Refill: 0    5. Acute suppurative otitis media of left ear without spontaneous rupture of tympanic membrane, recurrence not specified  - amoxicillin-clavulanate (Augmentin) 875-125 MG per tablet; Take 1 tablet by mouth 2  (Two) Times a Day for 7 days.  Dispense: 14 tablet; Refill: 0  - Dizziness and light headedness were occurring before extremely elevated BP today so symptoms are likely r/t AOM.     Follow Up     Return in about 4 weeks (around 5/12/2022) for HTN .    Patient was given instructions and counseling regarding his condition or for health maintenance advice. Please see specific information pulled into the AVS if appropriate.    Part of this note may be an electronic transcription/translation of spoken language to printed text using the Dragon Dictation System.    Electronically signed by:   DMITRIY Zaidi  04/14/2022

## 2023-11-20 ENCOUNTER — HOSPITAL ENCOUNTER (EMERGENCY)
Facility: HOSPITAL | Age: 48
Discharge: HOME | End: 2023-11-20
Attending: EMERGENCY MEDICINE | Admitting: EMERGENCY MEDICINE
Payer: COMMERCIAL

## 2023-11-20 ENCOUNTER — APPOINTMENT (OUTPATIENT)
Dept: GENERAL RADIOLOGY | Facility: HOSPITAL | Age: 48
End: 2023-11-20
Payer: COMMERCIAL

## 2023-11-20 VITALS
HEIGHT: 73 IN | OXYGEN SATURATION: 97 % | SYSTOLIC BLOOD PRESSURE: 139 MMHG | TEMPERATURE: 97.9 F | DIASTOLIC BLOOD PRESSURE: 88 MMHG | BODY MASS INDEX: 23.06 KG/M2 | WEIGHT: 174 LBS | RESPIRATION RATE: 16 BRPM | HEART RATE: 63 BPM

## 2023-11-20 DIAGNOSIS — I49.3 SYMPTOMATIC PVCS: Primary | ICD-10-CM

## 2023-11-20 DIAGNOSIS — R73.9 HYPERGLYCEMIA: ICD-10-CM

## 2023-11-20 DIAGNOSIS — R03.0 BORDERLINE HYPERTENSION: ICD-10-CM

## 2023-11-20 LAB
ALBUMIN SERPL-MCNC: 4.2 G/DL (ref 3.5–5.2)
ALBUMIN/GLOB SERPL: 1.9 G/DL
ALP SERPL-CCNC: 79 U/L (ref 39–117)
ALT SERPL W P-5'-P-CCNC: 33 U/L (ref 1–41)
ANION GAP SERPL CALCULATED.3IONS-SCNC: 11 MMOL/L (ref 5–15)
AST SERPL-CCNC: 23 U/L (ref 1–40)
BASOPHILS # BLD AUTO: 0.03 10*3/MM3 (ref 0–0.2)
BASOPHILS NFR BLD AUTO: 0.5 % (ref 0–1.5)
BILIRUB SERPL-MCNC: 0.6 MG/DL (ref 0–1.2)
BUN SERPL-MCNC: 14 MG/DL (ref 6–20)
BUN/CREAT SERPL: 15.4 (ref 7–25)
CALCIUM SPEC-SCNC: 9.2 MG/DL (ref 8.6–10.5)
CHLORIDE SERPL-SCNC: 104 MMOL/L (ref 98–107)
CO2 SERPL-SCNC: 23 MMOL/L (ref 22–29)
CREAT SERPL-MCNC: 0.91 MG/DL (ref 0.76–1.27)
DEPRECATED RDW RBC AUTO: 36.5 FL (ref 37–54)
EGFRCR SERPLBLD CKD-EPI 2021: 105.3 ML/MIN/1.73
EOSINOPHIL # BLD AUTO: 0.3 10*3/MM3 (ref 0–0.4)
EOSINOPHIL NFR BLD AUTO: 5.2 % (ref 0.3–6.2)
ERYTHROCYTE [DISTWIDTH] IN BLOOD BY AUTOMATED COUNT: 11.5 % (ref 12.3–15.4)
GLOBULIN UR ELPH-MCNC: 2.2 GM/DL
GLUCOSE SERPL-MCNC: 135 MG/DL (ref 65–99)
HCT VFR BLD AUTO: 47.1 % (ref 37.5–51)
HGB BLD-MCNC: 16.3 G/DL (ref 13–17.7)
HOLD SPECIMEN: NORMAL
IMM GRANULOCYTES # BLD AUTO: 0.02 10*3/MM3 (ref 0–0.05)
IMM GRANULOCYTES NFR BLD AUTO: 0.3 % (ref 0–0.5)
LYMPHOCYTES # BLD AUTO: 1.98 10*3/MM3 (ref 0.7–3.1)
LYMPHOCYTES NFR BLD AUTO: 34.4 % (ref 19.6–45.3)
MAGNESIUM SERPL-MCNC: 1.8 MG/DL (ref 1.6–2.6)
MCH RBC QN AUTO: 30.1 PG (ref 26.6–33)
MCHC RBC AUTO-ENTMCNC: 34.6 G/DL (ref 31.5–35.7)
MCV RBC AUTO: 86.9 FL (ref 79–97)
MONOCYTES # BLD AUTO: 0.35 10*3/MM3 (ref 0.1–0.9)
MONOCYTES NFR BLD AUTO: 6.1 % (ref 5–12)
NEUTROPHILS NFR BLD AUTO: 3.08 10*3/MM3 (ref 1.7–7)
NEUTROPHILS NFR BLD AUTO: 53.5 % (ref 42.7–76)
NRBC BLD AUTO-RTO: 0 /100 WBC (ref 0–0.2)
PLATELET # BLD AUTO: 252 10*3/MM3 (ref 140–450)
PMV BLD AUTO: 10.5 FL (ref 6–12)
POTASSIUM SERPL-SCNC: 4 MMOL/L (ref 3.5–5.2)
PROT SERPL-MCNC: 6.4 G/DL (ref 6–8.5)
QT INTERVAL: 378 MS
QTC INTERVAL: 401 MS
RBC # BLD AUTO: 5.42 10*6/MM3 (ref 4.14–5.8)
SODIUM SERPL-SCNC: 138 MMOL/L (ref 136–145)
TROPONIN T SERPL HS-MCNC: 6 NG/L
TSH SERPL DL<=0.05 MIU/L-ACNC: 1.82 UIU/ML (ref 0.27–4.2)
WBC NRBC COR # BLD AUTO: 5.76 10*3/MM3 (ref 3.4–10.8)
WHOLE BLOOD HOLD COAG: NORMAL
WHOLE BLOOD HOLD SPECIMEN: NORMAL

## 2023-11-20 PROCEDURE — 83735 ASSAY OF MAGNESIUM: CPT | Performed by: EMERGENCY MEDICINE

## 2023-11-20 PROCEDURE — 99284 EMERGENCY DEPT VISIT MOD MDM: CPT

## 2023-11-20 PROCEDURE — 80050 GENERAL HEALTH PANEL: CPT | Performed by: EMERGENCY MEDICINE

## 2023-11-20 PROCEDURE — 93005 ELECTROCARDIOGRAM TRACING: CPT | Performed by: EMERGENCY MEDICINE

## 2023-11-20 PROCEDURE — 84484 ASSAY OF TROPONIN QUANT: CPT | Performed by: EMERGENCY MEDICINE

## 2023-11-20 PROCEDURE — 71045 X-RAY EXAM CHEST 1 VIEW: CPT

## 2023-11-20 PROCEDURE — 93005 ELECTROCARDIOGRAM TRACING: CPT

## 2023-11-20 RX ORDER — METOPROLOL SUCCINATE 25 MG/1
25 TABLET, EXTENDED RELEASE ORAL DAILY
Qty: 14 TABLET | Refills: 0 | Status: SHIPPED | OUTPATIENT
Start: 2023-11-20 | End: 2023-12-04

## 2023-11-20 NOTE — ED PROVIDER NOTES
Subjective   History of Present Illness  This is a pleasant 47-year-old male who has been followed previously by [facility] primary care and [facility] cardiology I reviewed those notes.  He works full-time at [employer] on the [employer].  He is going through a marital separation.  He generally exercises by lifting weights.  Generally considers himself healthy.    He presents emergency room today with a 1 week history of palpitations associated with lightheadedness and dizziness and they are bad of feeling of impending doom.  Nothing seem to precipitate these last week he was seen in the [place] emergency department for the same was told he had anxiety as he believes he was probably hyperventilating at that time.  However he is continued to have episodes he could not sleep last night and comes to the emergency department here today seeking further clarification what is happening to them.    He says these episodes that he has been having are very similar to the ones he had in 2021 when he got admitted here.  He had stress test and echo which were reassuring.  I reviewed those.  He was treated for hypertension at that time as well as anxiety but is since not taken any medications for about a year and a half.  He has not taken anything for blood pressure or for anxiety and currently he denies any anxiety to me or depression.    He was taking vitamin supplements but stopped those a week ago when these episodes began.  He does not take any workout supplements or energy boosters.  He has no history of sleep apnea and tells me he does not snore and has no daytime sleepiness.    He has had no fevers or chills.  He has had no exertional chest pain or shortness of breath.  Bowel movements and urine have been okay.  In general he is felt well but while he is sitting here watching the monitor and he has PVCs and PACs he becomes quite symptomatic for them.        All other systems are reviewed and are negative except as noted  above.        Review of Systems   All other systems reviewed and are negative.      Past Medical History:   Diagnosis Date    Hypertension    2022 cardiology note  Problem List:  Aggressive disabling chest pain syndrome  [facility] ED 8/2/2021 with negative troponin x2, acceptable chest x-ray and EKG with no acute ST-T changes, proBNP and d dimer WNL  CCS class II chest pain/NYHA class I-II dyspnea on exertion symptoms  Cardiac PET 8/3/2021: Acceptable negative IV Lexiscan hybrid PET cardiac scan myocardial perfusion study suggestive of low probability for significant focal obstructive CAD with normal calculated coronary artery flow reserve and preserved systolic LV function (LVEF 0.49)  Echocardiogram 8/3/2021: LVEF 55%, RVSP less than 35 mmHg, normal RV cavity size, wall thickness, systolic function and septal motion noted.  No significant structural or functional valvular abnormalities demonstrated.  Residual class I symptoms  Elevated blood pressure without the diagnosis of hypertension  Hyperlipidemia; ASCVD 10-year risk is 2.5%, 1% if treated  Family History of CAD-brother has an ICD  H/O Rectal bleeding  Remote arthroscopic right knee surgery     Allergies   Allergen Reactions    Iodinated Contrast Media Anaphylaxis       Past Surgical History:   Procedure Laterality Date    KNEE SURGERY         Family History   Family history unknown: Yes       Social History     Socioeconomic History    Marital status:    Tobacco Use    Smoking status: Former    Smokeless tobacco: Former   Vaping Use    Vaping Use: Never used   Substance and Sexual Activity    Alcohol use: Yes     Comment: occasional    Drug use: No    Sexual activity: Defer           Objective   Physical Exam  Vitals and nursing note reviewed.   Constitutional:       Appearance: He is normal weight.      Comments: Pleasant 47-year-old alert and oriented [employer] 15 appears little bit anxious especially when he is watching the monitor.   HENT:      Head:  Normocephalic and atraumatic.      Right Ear: External ear normal.      Left Ear: External ear normal.      Nose: Nose normal.      Mouth/Throat:      Mouth: Mucous membranes are moist.      Pharynx: Oropharynx is clear.   Eyes:      Extraocular Movements: Extraocular movements intact.      Conjunctiva/sclera: Conjunctivae normal.      Pupils: Pupils are equal, round, and reactive to light.   Neck:      Vascular: No carotid bruit.   Cardiovascular:      Rate and Rhythm: Normal rate and regular rhythm.      Pulses: Normal pulses.      Heart sounds: Normal heart sounds.   Pulmonary:      Effort: Pulmonary effort is normal.      Breath sounds: Normal breath sounds.   Abdominal:      General: Abdomen is flat. Bowel sounds are normal. There is no distension.      Palpations: Abdomen is soft. There is no mass.      Tenderness: There is no abdominal tenderness.   Musculoskeletal:      Cervical back: Normal range of motion and neck supple. No rigidity or tenderness.      Comments: Equal pulses without edema, synovitis, rash, or venous cords.   Lymphadenopathy:      Cervical: No cervical adenopathy.   Skin:     General: Skin is warm and dry.      Capillary Refill: Capillary refill takes less than 2 seconds.   Neurological:      Mental Status: He is alert.      Comments: Face symmetric, voice strong, tongue midline.  Vision, hearing, and speech preserved.  No focal weakness.         Procedures           ED Course           Recent Results (from the past 24 hour(s))   ECG 12 Lead Dyspnea    Collection Time: 11/20/23  5:28 AM   Result Value Ref Range    QT Interval 378 ms    QTC Interval 401 ms   Green Top (Gel)    Collection Time: 11/20/23  5:54 AM   Result Value Ref Range    Extra Tube Hold for add-ons.    Lavender Top    Collection Time: 11/20/23  5:54 AM   Result Value Ref Range    Extra Tube hold for add-on    Gold Top - SST    Collection Time: 11/20/23  5:54 AM   Result Value Ref Range    Extra Tube Hold for add-ons.     Waggoner Top    Collection Time: 11/20/23  5:54 AM   Result Value Ref Range    Extra Tube Hold for add-ons.    Light Blue Top    Collection Time: 11/20/23  5:54 AM   Result Value Ref Range    Extra Tube Hold for add-ons.    Comprehensive Metabolic Panel    Collection Time: 11/20/23  5:54 AM    Specimen: Blood   Result Value Ref Range    Glucose 135 (H) 65 - 99 mg/dL    BUN 14 6 - 20 mg/dL    Creatinine 0.91 0.76 - 1.27 mg/dL    Sodium 138 136 - 145 mmol/L    Potassium 4.0 3.5 - 5.2 mmol/L    Chloride 104 98 - 107 mmol/L    CO2 23.0 22.0 - 29.0 mmol/L    Calcium 9.2 8.6 - 10.5 mg/dL    Total Protein 6.4 6.0 - 8.5 g/dL    Albumin 4.2 3.5 - 5.2 g/dL    ALT (SGPT) 33 1 - 41 U/L    AST (SGOT) 23 1 - 40 U/L    Alkaline Phosphatase 79 39 - 117 U/L    Total Bilirubin 0.6 0.0 - 1.2 mg/dL    Globulin 2.2 gm/dL    A/G Ratio 1.9 g/dL    BUN/Creatinine Ratio 15.4 7.0 - 25.0    Anion Gap 11.0 5.0 - 15.0 mmol/L    eGFR 105.3 >60.0 mL/min/1.73   Single High Sensitivity Troponin T    Collection Time: 11/20/23  5:54 AM    Specimen: Blood   Result Value Ref Range    HS Troponin T 6 <22 ng/L   Magnesium    Collection Time: 11/20/23  5:54 AM    Specimen: Blood   Result Value Ref Range    Magnesium 1.8 1.6 - 2.6 mg/dL   TSH    Collection Time: 11/20/23  5:54 AM    Specimen: Blood   Result Value Ref Range    TSH 1.820 0.270 - 4.200 uIU/mL   CBC Auto Differential    Collection Time: 11/20/23  5:54 AM    Specimen: Blood   Result Value Ref Range    WBC 5.76 3.40 - 10.80 10*3/mm3    RBC 5.42 4.14 - 5.80 10*6/mm3    Hemoglobin 16.3 13.0 - 17.7 g/dL    Hematocrit 47.1 37.5 - 51.0 %    MCV 86.9 79.0 - 97.0 fL    MCH 30.1 26.6 - 33.0 pg    MCHC 34.6 31.5 - 35.7 g/dL    RDW 11.5 (L) 12.3 - 15.4 %    RDW-SD 36.5 (L) 37.0 - 54.0 fl    MPV 10.5 6.0 - 12.0 fL    Platelets 252 140 - 450 10*3/mm3    Neutrophil % 53.5 42.7 - 76.0 %    Lymphocyte % 34.4 19.6 - 45.3 %    Monocyte % 6.1 5.0 - 12.0 %    Eosinophil % 5.2 0.3 - 6.2 %    Basophil % 0.5 0.0 - 1.5  %    Immature Grans % 0.3 0.0 - 0.5 %    Neutrophils, Absolute 3.08 1.70 - 7.00 10*3/mm3    Lymphocytes, Absolute 1.98 0.70 - 3.10 10*3/mm3    Monocytes, Absolute 0.35 0.10 - 0.90 10*3/mm3    Eosinophils, Absolute 0.30 0.00 - 0.40 10*3/mm3    Basophils, Absolute 0.03 0.00 - 0.20 10*3/mm3    Immature Grans, Absolute 0.02 0.00 - 0.05 10*3/mm3    nRBC 0.0 0.0 - 0.2 /100 WBC     Note: In addition to lab results from this visit, the labs listed above may include labs taken at another facility or during a different encounter within the last 24 hours. Please correlate lab times with ED admission and discharge times for further clarification of the services performed during this visit.    XR Chest 1 View   Final Result   Impression:   No acute cardiopulmonary abnormality.            Electronically Signed: Mark Dalton MD     11/20/2023 6:38 AM EST     Workstation ID: [id number]        Vitals:    11/20/23 0630 11/20/23 0700 11/20/23 0730 11/20/23 0745   BP: 140/89 134/95 139/88    Pulse: 71 70 63 63   Resp:       Temp:       SpO2: 97% 98% 98% 97%   Weight:       Height:         Medications - No data to display  ECG/EMG Results (last 24 hours)       Procedure Component Value Units Date/Time    ECG 12 Lead Dyspnea [[id number]] Collected: 11/20/23 0528     Updated: 11/20/23 0602     QT Interval 378 ms      QTC Interval 401 ms     Narrative:      Test Reason : Dyspnea  Blood Pressure :   */*   mmHG  Vent. Rate :  68 BPM     Atrial Rate :  68 BPM     P-R Int : 128 ms          QRS Dur :  86 ms      QT Int : 378 ms       P-R-T Axes :   7  -2  10 degrees     QTc Int : 401 ms    Normal sinus rhythm  Normal ECG  When compared with ECG of 03-AUG-2021 05:40,  No significant change was found  Confirmed by JAY NORMAN MD ([id number]) on 11/20/2023 6:02:39 AM    Referred By:            Confirmed By: JAY NORMAN MD          ECG 12 Lead Dyspnea   Final Result   Test Reason : Dyspnea   Blood Pressure :   */*   mmHG   Vent. Rate :  68 BPM      Atrial Rate :  68 BPM      P-R Int : 128 ms          QRS Dur :  86 ms       QT Int : 378 ms       P-R-T Axes :   7  -2  10 degrees      QTc Int : 401 ms      Normal sinus rhythm   Normal ECG   When compared with ECG of 03-AUG-2021 05:40,   No significant change was found   Confirmed by GERALD SALINAS MD ([id number]) on 11/20/2023 6:02:39 AM      Referred By:            Confirmed By: GERALD SALINAS MD                                  [facility] reviewed by Gerald Salinas MD       Medical Decision Making        I reviewed all available studies at the bedside with the patient.  His labs are bland.  He has mild hyperglycemia.  Borderline hypertension.  Not currently taking any medications he tells me.    At this point I really think the patient has symptomatic PVCs.  Likely this induces some sort of almost a panic reaction for him.    I talked about going back on BuSpar or another anxiolytic but he is not interested in this.  I think it is very reasonable to refer him to the arrhythmia center so he can get a cardiac monitor to see what his burden of ectopy really is to see if he is a candidate for other more aggressive treatment.  I think it is reasonable to start him on magnesium glycinate now and then a low-dose beta-blocker to see if this helps also could help with his blood pressure.  We will give him a 2-week trial.  I have asked him to follow-up with his primary care as well.  Discussed sleep apnea but he does not feel like he has this.    He will return to the emergency department if worse in any way.    All are agreeable with the plan    Problems Addressed:  Borderline hypertension: chronic illness or injury with exacerbation, progression, or side effects of treatment  Hyperglycemia: undiagnosed new problem with uncertain prognosis  Symptomatic PVCs: chronic illness or injury with exacerbation, progression, or side effects of treatment    Amount and/or Complexity of Data Reviewed  External Data Reviewed: labs,  radiology, ECG and notes.  Labs: ordered. Decision-making details documented in ED Course.  Radiology: ordered and independent interpretation performed. Decision-making details documented in ED Course.  ECG/medicine tests: ordered and independent interpretation performed. Decision-making details documented in ED Course.    Risk  Prescription drug management.        Final diagnoses:   Symptomatic PVCs   Hyperglycemia   Borderline hypertension       ED Disposition  ED Disposition       ED Disposition   Discharge    Condition   Stable    Comment   --               Keep your follow-up for the doctor in [place] that you have an appointment with    Schedule an appointment as soon as possible for a visit       [facility] CARDIOLOGY  [address]  [address]  [facility] [address]  [phone]  Schedule an appointment as soon as possible for a visit            Medication List        New Prescriptions      metoprolol succinate XL 25 MG 24 hr tablet  Commonly known as: TOPROL-XL  Take 1 tablet by mouth Daily for 14 days.            Stop      amLODIPine 10 MG tablet  Commonly known as: NORVASC     busPIRone 10 MG tablet  Commonly known as: BUSPAR     chlorthalidone 25 MG tablet  Commonly known as: HYGROTON     escitalopram 20 MG tablet  Commonly known as: Lexapro     fluticasone 50 MCG/ACT nasal spray  Commonly known as: Flonase     nitroglycerin 0.4 MG SL tablet  Commonly known as: NITROSTAT     ramipril 10 MG capsule  Commonly known as: Altace               Where to Get Your Medications        These medications were sent to [facility] [address] [place] [address] - [address] [place] [address] - [phone]  - [phone]   [address] [facility] [address]      Phone: [phone]   metoprolol succinate XL 25 MG 24 hr tablet            Gerald Salinas MD  11/20/23 5177

## 2023-11-20 NOTE — DISCHARGE INSTRUCTIONS
Take an over-the-counter magnesium glycinate supplement for a couple weeks to see if this helps with your PVCs.  You get it at a [employer] or [employer].    You can use the lorazepam you were prescribed previously at night if you are having episodes where you cannot sleep.    Be sure to keep your follow-up with your primary care doctor in [place] as you have already scheduled.  I have referred you to the cardiac event center so they can talk to you more about this and monitor your progress on the medication.

## 2023-11-20 NOTE — Clinical Note
[facility] EMERGENCY DEPARTMENT  [address]  [facility] [address]  Phone: [phone]    Panda Garcia was seen and treated in our emergency department on 11/20/2023.  He may return to work on 11/21/2023.         Thank you for choosing [facility].    Gerald Salinas MD

## 2023-11-28 ENCOUNTER — OFFICE VISIT (OUTPATIENT)
Age: 48
End: 2023-11-28
Payer: COMMERCIAL

## 2023-11-28 VITALS
WEIGHT: 190 LBS | BODY MASS INDEX: 25.18 KG/M2 | SYSTOLIC BLOOD PRESSURE: 138 MMHG | OXYGEN SATURATION: 98 % | DIASTOLIC BLOOD PRESSURE: 88 MMHG | HEIGHT: 73 IN | HEART RATE: 88 BPM

## 2023-11-28 DIAGNOSIS — F51.05 INSOMNIA DUE TO OTHER MENTAL DISORDER: ICD-10-CM

## 2023-11-28 DIAGNOSIS — F99 INSOMNIA DUE TO OTHER MENTAL DISORDER: ICD-10-CM

## 2023-11-28 DIAGNOSIS — R00.2 PALPITATIONS: ICD-10-CM

## 2023-11-28 DIAGNOSIS — R03.0 ELEVATED BP WITHOUT DIAGNOSIS OF HYPERTENSION: ICD-10-CM

## 2023-11-28 DIAGNOSIS — F41.9 ANXIETY: Primary | ICD-10-CM

## 2023-11-28 DIAGNOSIS — J30.9 ALLERGIC RHINITIS, UNSPECIFIED SEASONALITY, UNSPECIFIED TRIGGER: ICD-10-CM

## 2023-11-28 PROBLEM — I10 ESSENTIAL HYPERTENSION: Status: RESOLVED | Noted: 2021-08-09 | Resolved: 2023-11-28

## 2023-11-28 PROBLEM — I20.89 STABLE ANGINA: Status: RESOLVED | Noted: 2021-08-02 | Resolved: 2023-11-28

## 2023-11-28 PROBLEM — E78.5 HYPERLIPIDEMIA LDL GOAL <100: Status: RESOLVED | Noted: 2021-09-16 | Resolved: 2023-11-28

## 2023-11-28 RX ORDER — LORAZEPAM 0.5 MG/1
TABLET ORAL
COMMUNITY
Start: 2023-11-14

## 2023-11-28 RX ORDER — HYDROXYZINE HYDROCHLORIDE 25 MG/1
TABLET, FILM COATED ORAL
Qty: 60 TABLET | Refills: 1 | Status: SHIPPED | OUTPATIENT
Start: 2023-11-28

## 2023-11-28 RX ORDER — VITAMIN B COMPLEX
1 CAPSULE ORAL
COMMUNITY

## 2023-11-28 RX ORDER — ESCITALOPRAM OXALATE 10 MG/1
10 TABLET ORAL DAILY
Qty: 30 TABLET | Refills: 1 | Status: SHIPPED | OUTPATIENT
Start: 2023-11-28

## 2023-11-28 NOTE — PROGRESS NOTES
"New Patient Note    Subjective      Panda \"Laith\" is a 47 y.o. male.    Chief Complaint   Patient presents with    Irregular Heart Beat    Anxiety     Diazepam is preferred    Hypertension    Insomnia       HPI    Palpitations  - feels like heart is skipping  - feels like it is going fast  - it feels like a quick extra beat or 2 and then goes away but then comes back in mins-hrs  - no chest pain  - sometimes feel lightheaded  - never passed out  - had an episode at work where felt light headed and palpitations and [place] ED felt like he had a panic attack  - labs at [facility] normal  - EKG normal  - placed on Metoprolol by ED and given Ativan  - had a stress test with [facility] that was normal around 2 yrs ago per patient  - hydrate well, 40-50oz of water per day  - cup of coffee only in the am  - does not notice any difference around bedtime, activity, exertion, prolonged standing    Anxiety  Insomnia  - not sure that he has anxiety  - placed on Lexapro, he did not think it helped but his BP was lower  - GAD7=11 moderate anxiety  - fidgets, feels like hard to stop motor, worries excessively about several things, trouble relaxing, does impact work/home/interaction with people, feels like something awful might happen    Elevated BP  - today 138/88  - also struggling with anxiety   - no cp  - episode of 190 and 200 systolic at ED    Allergic Rhinitis  - uses Claritin and nasal spray occasionally  - no albuterol use  - allergic to cats   - never had lung function tests, do respond to Albuterol with allergies, cats,   - does not need albuterol for cold    Past Medical History:  Patient Active Problem List   Diagnosis    Stable angina    Anxiety    Essential hypertension    Mild intermittent asthma    Hyperlipidemia LDL goal <100        Medications:    Current Outpatient Medications:     albuterol sulfate  (90 Base) MCG/ACT inhaler, Inhale 2 puffs Every 4 (Four) Hours As Needed for Wheezing., Disp: 6.7 g, Rfl: 0   " " b complex vitamins capsule, Take 1 capsule by mouth., Disp: , Rfl:     metoprolol succinate XL (TOPROL-XL) 25 MG 24 hr tablet, Take 1 tablet by mouth Daily for 14 days., Disp: 14 tablet, Rfl: 0    LORazepam (ATIVAN) 0.5 MG tablet, TAKE 1 TABLET ORAL ROUTE AT BEDTIME AS NEEDED AS NEEDED FOR ANXIETY (Patient not taking: Reported on 11/28/2023), Disp: , Rfl:      Allergy to Medications:  Allergies   Allergen Reactions    Iodinated Contrast Media Anaphylaxis        Immunizations:    There is no immunization history on file for this patient.    Surgeries:  Past Surgical History:   Procedure Laterality Date    KNEE SURGERY          Family History:  Family History   Problem Relation Age of Onset    Heart disease Brother     Diabetes Brother         Social:  Tobacco Use: former, quit in 2018  ETOH Use:rare use  Drug Use:never  Household:  Occupation:"[employer]  Hobbies:  Exercise:      Review of Systems    Objective   /88   Pulse 88   Ht 185.4 cm (72.99\")   Wt 86.2 kg (190 lb)   SpO2 98%   BMI 25.07 kg/m²        Physical Exam  Vitals reviewed.   Constitutional:       General: He is not in acute distress.     Appearance: Normal appearance.   HENT:      Head: Normocephalic and atraumatic.      Mouth/Throat:      Mouth: Mucous membranes are moist.   Eyes:      Conjunctiva/sclera: Conjunctivae normal.   Cardiovascular:      Rate and Rhythm: Normal rate and regular rhythm.      Heart sounds: Normal heart sounds. No murmur heard.  Pulmonary:      Effort: Pulmonary effort is normal. No respiratory distress.      Breath sounds: Normal breath sounds.   Abdominal:      General: Abdomen is flat. Bowel sounds are normal. There is no distension.      Palpations: Abdomen is soft.      Tenderness: There is no abdominal tenderness. There is no guarding or rebound.   Skin:     General: Skin is warm and dry.   Neurological:      Mental Status: He is alert.   Psychiatric:      Comments: anxious         Assessment & Plan "     Palpitations  - seen in the ED x2, once at [facility] and once in [place]  - Reviewed 11/2023 ED note  - Reviewed 11/2923 ED labs: CBC without anemia, CMP without electrolyte disturbance, TSH normal, Mg normal  - Reviewed 11/2023 CXR that was normal  - Reviewed 11/2023 EKG NSR  - patient reports that he had episode of CP approx 2yrs ago that resulted in seeing [facility] Cards and having a stress test that was reportedly normal  - ED started Metoprolol 25 XL that has not changed sx  - previous PCP and [place] ED felt that he was having DEACON with Panic attacks  - hydration appropriate and minimal caffeine  - recommended that he wear a monitor, follow up with Cards scheduled this week, advised patient to discuss monitor with them  - will not adjust BB as do not want to increase dose prior to wearing monitor  - no CP or syncope with palpitations    DEACON and Panic Attacks  - GAD7 today score 11 indicating moderate anxiety  - will start Lexapro 10mg  - will stop benzo as sedating  - will give Hydroxyzine for PRN anxiety, advised to try at home first  - discussed counseling, will continue to recommend  - will FU in 1mo  - discussed if mood change with SSRI to stop med and call office    Insomnia  - start Hydroxyzine     Allergic Rhinitis  - will stop Claritin and start Zyrtec  - will start Nasocort  - counseled on importance of taking daily    Elevated BP  - patient reports in the ED he has had systolics of 190-200  - these Bps were during times of anxiety and ED visits  - today visibly anxious and fidgeting, /88  - will work to address anxiety prior to treating BP     FU in 1mo    Time: 60min face to face and nonface to face time. Discussion of sx, review of ED notes, labs, imaging, completion of FMLA forms, documentation,       Bessie Nieves MD, FAAP, FACP  Internal Medicine and Pediatrics  [facility]

## 2023-11-30 ENCOUNTER — HOSPITAL ENCOUNTER (OUTPATIENT)
Dept: CARDIOLOGY | Facility: HOSPITAL | Age: 48
Discharge: HOME | End: 2023-11-30
Payer: COMMERCIAL

## 2023-11-30 ENCOUNTER — TELEMEDICINE (OUTPATIENT)
Dept: CARDIOLOGY | Facility: HOSPITAL | Age: 48
End: 2023-11-30
Payer: COMMERCIAL

## 2023-11-30 VITALS
TEMPERATURE: 97.3 F | HEART RATE: 59 BPM | SYSTOLIC BLOOD PRESSURE: 154 MMHG | RESPIRATION RATE: 16 BRPM | OXYGEN SATURATION: 96 % | HEIGHT: 73 IN | WEIGHT: 189 LBS | DIASTOLIC BLOOD PRESSURE: 86 MMHG | BODY MASS INDEX: 25.05 KG/M2

## 2023-11-30 DIAGNOSIS — I10 ESSENTIAL HYPERTENSION: ICD-10-CM

## 2023-11-30 DIAGNOSIS — R00.2 PALPITATIONS: ICD-10-CM

## 2023-11-30 DIAGNOSIS — R00.2 PALPITATIONS: Primary | ICD-10-CM

## 2023-11-30 PROCEDURE — 99213 OFFICE O/P EST LOW 20 MIN: CPT | Performed by: NURSE PRACTITIONER

## 2023-11-30 PROCEDURE — 93246 EXT ECG>7D<15D RECORDING: CPT

## 2023-12-01 ENCOUNTER — PATIENT ROUNDING (BHMG ONLY) (OUTPATIENT)
Age: 48
End: 2023-12-01
Payer: COMMERCIAL

## 2023-12-01 RX ORDER — METOPROLOL SUCCINATE 50 MG/1
50 TABLET, EXTENDED RELEASE ORAL DAILY
Qty: 30 TABLET | Refills: 11 | Status: SHIPPED | OUTPATIENT
Start: 2023-12-01

## 2023-12-04 NOTE — PROGRESS NOTES
"Chief Complaint  Establish Care (Palpitations, HTN)    Subjective    History of Present Illness {CC  Problem List  Visit  Diagnosis   Encounters  Notes  Medications  Labs  Result Review Imaging  Media :23}   You have chosen to receive care through the use of telemedicine. Telemedicine enables health care providers at different locations to provide safe, effective, and convenient care through the use of technology. As with any health care service, there are risks associated with the use of telemedicine, including equipment failure, poor connections, and  issues.    Do you understand the risks and benefits of telemedicine as I have explained them to you? Yes  Have your questions regarding telemedicine been answered? Yes  Do you consent to the use of telemedicine in your medical care today? Yes     History of Present Illness   47 year old presents for telehealth visit today for ongoing evaluation of his chest pain, palpitations and elevated blood pressure readings. He reports that his bp has been elevated lately running 150s systolic. He also reports that he has been experiencing chest tightness and the irregular beats since his bp has been elevated. Notes fatigue as well.   Objective     Vital Signs:   Vitals:    11/30/23 1632   BP: 154/86   BP Location: Left arm   Patient Position: Sitting   Cuff Size: Adult   Pulse: 59   Resp: 16   Temp: 97.3 °F (36.3 °C)   TempSrc: Temporal   SpO2: 96%   Weight: 85.7 kg (189 lb)   Height: 185.4 cm (72.99\")     Body mass index is 24.94 kg/m².  Physical Exam  Vitals and nursing note reviewed.   Constitutional:       Appearance: Normal appearance.   HENT:      Head: Normocephalic.   Eyes:      Pupils: Pupils are equal, round, and reactive to light.   Cardiovascular:      Rate and Rhythm: Normal rate and regular rhythm.      Pulses: Normal pulses.      Heart sounds: Normal heart sounds. No murmur heard.  Pulmonary:      Effort: Pulmonary effort is normal. "      Breath sounds: Normal breath sounds.   Abdominal:      General: Bowel sounds are normal.      Palpations: Abdomen is soft.   Musculoskeletal:         General: Normal range of motion.      Cervical back: Normal range of motion.      Right lower leg: No edema.      Left lower leg: No edema.   Skin:     General: Skin is warm and dry.      Capillary Refill: Capillary refill takes less than 2 seconds.   Neurological:      Mental Status: He is alert and oriented to person, place, and time.   Psychiatric:         Mood and Affect: Mood normal.         Thought Content: Thought content normal.              Result Review  Data Reviewed:{ Labs  Result Review  Imaging  Med Tab  Media :23}   Stress Test With Pet Myocardial Perfusion (08/03/2021 08:57)  Adult Transthoracic Echo Complete W/ Cont if Necessary Per Protocol (08/03/2021 11:39)  ECG 12 Lead Dyspnea (11/20/2023 05:28)  TSH (11/20/2023 05:54)  Magnesium (11/20/2023 05:54)  Single High Sensitivity Troponin T (11/20/2023 05:54)  Comprehensive Metabolic Panel (11/20/2023 05:54)  CBC & Differential (11/20/2023 05:54)           Assessment and Plan {CC Problem List  Visit Diagnosis  ROS  Review (Popup)  Health Maintenance  Quality  BestPractice  Medications  SmartSets  SnapShot Encounters  Media :23}   1. Palpitations    - Holter Monitor - 72 Hour Up To 15 Days; Future  - Adult Transthoracic Echo Complete W/ Cont if Necessary Per Protocol; Future    2. Essential hypertension  Increase toprol to 50 mg daily   Monitor bp closely   - Adult Transthoracic Echo Complete W/ Cont if Necessary Per Protocol; Future          Follow Up {Instructions Charge Capture  Follow-up Communications :23}   No follow-ups on file.    Patient was given instructions and counseling regarding his condition or for health maintenance advice. Please see specific information pulled into the AVS if appropriate.  Patient was instructed to call the [facility] with any  questions, concerns, or worsening symptoms.

## 2023-12-08 ENCOUNTER — PATIENT MESSAGE (OUTPATIENT)
Age: 48
End: 2023-12-08
Payer: COMMERCIAL

## 2023-12-10 ENCOUNTER — HOSPITAL ENCOUNTER (OUTPATIENT)
Dept: CARDIOLOGY | Facility: HOSPITAL | Age: 48
Discharge: HOME | End: 2023-12-10
Admitting: NURSE PRACTITIONER
Payer: COMMERCIAL

## 2023-12-10 DIAGNOSIS — R00.2 PALPITATIONS: ICD-10-CM

## 2023-12-10 DIAGNOSIS — I10 ESSENTIAL HYPERTENSION: ICD-10-CM

## 2023-12-10 LAB
BH CV ECHO MEAS - AO MAX PG: 13.4 MMHG
BH CV ECHO MEAS - AO MEAN PG: 7 MMHG
BH CV ECHO MEAS - AO ROOT DIAM: 2.9 CM
BH CV ECHO MEAS - AO V2 MAX: 183 CM/SEC
BH CV ECHO MEAS - AO V2 VTI: 44.4 CM
BH CV ECHO MEAS - AVA(I,D): 2.15 CM2
BH CV ECHO MEAS - EDV(CUBED): 140.6 ML
BH CV ECHO MEAS - EDV(MOD-SP2): 92 ML
BH CV ECHO MEAS - EDV(MOD-SP4): 110 ML
BH CV ECHO MEAS - EF(MOD-BP): 55.3 %
BH CV ECHO MEAS - EF(MOD-SP2): 60.4 %
BH CV ECHO MEAS - EF(MOD-SP4): 55.1 %
BH CV ECHO MEAS - EF_3D-VOL: 52 %
BH CV ECHO MEAS - ESV(CUBED): 59.3 ML
BH CV ECHO MEAS - ESV(MOD-SP2): 36.4 ML
BH CV ECHO MEAS - ESV(MOD-SP4): 49.4 ML
BH CV ECHO MEAS - FS: 25 %
BH CV ECHO MEAS - IVS/LVPW: 0.75 CM
BH CV ECHO MEAS - IVSD: 0.9 CM
BH CV ECHO MEAS - LA DIMENSION: 3.5 CM
BH CV ECHO MEAS - LAT PEAK E' VEL: 14.8 CM/SEC
BH CV ECHO MEAS - LV MASS(C)D: 207.3 GRAMS
BH CV ECHO MEAS - LV MAX PG: 8.5 MMHG
BH CV ECHO MEAS - LV MEAN PG: 4 MMHG
BH CV ECHO MEAS - LV V1 MAX: 146 CM/SEC
BH CV ECHO MEAS - LV V1 VTI: 33.6 CM
BH CV ECHO MEAS - LVIDD: 5.2 CM
BH CV ECHO MEAS - LVIDS: 3.9 CM
BH CV ECHO MEAS - LVOT AREA: 2.8 CM2
BH CV ECHO MEAS - LVOT DIAM: 1.9 CM
BH CV ECHO MEAS - LVPWD: 1.2 CM
BH CV ECHO MEAS - MED PEAK E' VEL: 10.1 CM/SEC
BH CV ECHO MEAS - MV A MAX VEL: 53.5 CM/SEC
BH CV ECHO MEAS - MV DEC SLOPE: 600 CM/SEC2
BH CV ECHO MEAS - MV DEC TIME: 0.14 SEC
BH CV ECHO MEAS - MV E MAX VEL: 85.9 CM/SEC
BH CV ECHO MEAS - MV E/A: 1.61
BH CV ECHO MEAS - MV MAX PG: 3.3 MMHG
BH CV ECHO MEAS - MV MEAN PG: 2 MMHG
BH CV ECHO MEAS - MV V2 VTI: 31.6 CM
BH CV ECHO MEAS - MVA(VTI): 3 CM2
BH CV ECHO MEAS - PA ACC TIME: 0.18 SEC
BH CV ECHO MEAS - PA V2 MAX: 150 CM/SEC
BH CV ECHO MEAS - SV(LVOT): 95.3 ML
BH CV ECHO MEAS - SV(MOD-SP2): 55.6 ML
BH CV ECHO MEAS - SV(MOD-SP4): 60.6 ML
BH CV ECHO MEAS - TAPSE (>1.6): 2.24 CM
BH CV ECHO MEASUREMENTS AVERAGE E/E' RATIO: 6.9
BH CV XLRA - RV BASE: 3.8 CM
BH CV XLRA - RV LENGTH: 7.9 CM
BH CV XLRA - RV MID: 2.8 CM
BH CV XLRA - TDI S': 13.8 CM/SEC
LEFT ATRIUM VOLUME INDEX: 32 ML/M2

## 2023-12-10 PROCEDURE — 93306 TTE W/DOPPLER COMPLETE: CPT | Performed by: INTERNAL MEDICINE

## 2023-12-10 PROCEDURE — 93306 TTE W/DOPPLER COMPLETE: CPT

## 2023-12-29 DIAGNOSIS — F41.9 ANXIETY: ICD-10-CM

## 2023-12-29 RX ORDER — ESCITALOPRAM OXALATE 10 MG/1
10 TABLET ORAL DAILY
Qty: 30 TABLET | Refills: 1 | Status: SHIPPED | OUTPATIENT
Start: 2023-12-29

## 2023-12-29 NOTE — TELEPHONE ENCOUNTER
Rx Refill Note  Requested Prescriptions     Pending Prescriptions Disp Refills    escitalopram (LEXAPRO) 10 MG tablet 30 tablet 1     Sig: Take 1 tablet by mouth Daily.      Last office visit with prescribing clinician: 11/28/2023   Last telemedicine visit with prescribing clinician: Visit date not found   Next office visit with prescribing clinician: 1/4/2024                         Would you like a call back once the refill request has been completed: [] Yes [x] No    If the office needs to give you a call back, can they leave a voicemail: [] Yes [x] No    Karma Hunter MA  12/29/23, 10:54 EST     FU in 1mo   pt has appointment 1/4/24

## 2023-12-29 NOTE — TELEPHONE ENCOUNTER
"    Caller: Panda Garcia \"Laith\"    Relationship: Self    Best call back number: [phone]     Requested Prescriptions:   Requested Prescriptions     Pending Prescriptions Disp Refills    escitalopram (LEXAPRO) 10 MG tablet 30 tablet 1     Sig: Take 1 tablet by mouth Daily.      PATIENT HAS AN APPOINTMENT ON JAN 4TH AND WILL BE OUT OF MEDICATION TODAY  Pharmacy where request should be sent: [address] [phone] [facility] [phone]      Last office visit with prescribing clinician: 11/28/2023   Last telemedicine visit with prescribing clinician: Visit date not found   Next office visit with prescribing clinician: 1/4/2024     Additional details provided by patient: PATIENT IS OUT OF MEDICATION!    Does the patient have less than a 3 day supply:  [x] Yes  [] No    Would you like a call back once the refill request has been completed: [] Yes [x] No    If the office needs to give you a call back, can they leave a voicemail: [] Yes [x] No    Helder Machado Rep   12/29/23 10:42 EST         DELETE AFTER READING TO PATIENT: “Thank you for sharing this information with me. I will send a message to the clinical team. Please allow 48 hours for the clinical staff to follow up on this request.”   "

## 2024-01-04 ENCOUNTER — OFFICE VISIT (OUTPATIENT)
Age: 49
End: 2024-01-04
Payer: COMMERCIAL

## 2024-01-04 ENCOUNTER — TELEMEDICINE (OUTPATIENT)
Dept: CARDIOLOGY | Facility: HOSPITAL | Age: 49
End: 2024-01-04
Payer: COMMERCIAL

## 2024-01-04 VITALS
BODY MASS INDEX: 25.18 KG/M2 | WEIGHT: 190 LBS | SYSTOLIC BLOOD PRESSURE: 128 MMHG | TEMPERATURE: 98.2 F | DIASTOLIC BLOOD PRESSURE: 78 MMHG | HEART RATE: 75 BPM | RESPIRATION RATE: 14 BRPM | OXYGEN SATURATION: 96 % | HEIGHT: 73 IN

## 2024-01-04 VITALS
WEIGHT: 190 LBS | DIASTOLIC BLOOD PRESSURE: 90 MMHG | BODY MASS INDEX: 25.18 KG/M2 | HEIGHT: 73 IN | SYSTOLIC BLOOD PRESSURE: 150 MMHG | HEART RATE: 90 BPM

## 2024-01-04 DIAGNOSIS — E78.5 HYPERLIPIDEMIA LDL GOAL <100: ICD-10-CM

## 2024-01-04 DIAGNOSIS — F99 INSOMNIA DUE TO OTHER MENTAL DISORDER: ICD-10-CM

## 2024-01-04 DIAGNOSIS — R00.2 PALPITATIONS: Primary | ICD-10-CM

## 2024-01-04 DIAGNOSIS — F51.05 INSOMNIA DUE TO OTHER MENTAL DISORDER: ICD-10-CM

## 2024-01-04 DIAGNOSIS — I10 ESSENTIAL HYPERTENSION: ICD-10-CM

## 2024-01-04 DIAGNOSIS — F41.9 ANXIETY: ICD-10-CM

## 2024-01-04 PROCEDURE — 99214 OFFICE O/P EST MOD 30 MIN: CPT | Performed by: INTERNAL MEDICINE

## 2024-01-04 RX ORDER — METOPROLOL SUCCINATE 100 MG/1
100 TABLET, EXTENDED RELEASE ORAL DAILY
Qty: 30 TABLET | Refills: 3 | Status: SHIPPED | OUTPATIENT
Start: 2024-01-04

## 2024-01-04 NOTE — PROGRESS NOTES
"Chief Complaint  Follow-up (Htn,palpitations )    Subjective    History of Present Illness {CC  Problem List  Visit  Diagnosis   Encounters  Notes  Medications  Labs  Result Review Imaging  Media :23}   You have chosen to receive care through the use of telemedicine. Telemedicine enables health care providers at different locations to provide safe, effective, and convenient care through the use of technology. As with any health care service, there are risks associated with the use of telemedicine, including equipment failure, poor connections, and  issues.    Do you understand the risks and benefits of telemedicine as I have explained them to you? Yes  Have your questions regarding telemedicine been answered? Yes  Do you consent to the use of telemedicine in your medical care today? Yes     History of Present Illness   47 year old presents for telehealth visit today for ongoing evaluation of his chest pain, palpitations and elevated blood pressure readings. He reports that his bp has been elevated lately running 150s systolic. He also reports that his heart rate is still running in the 90s. Notes that palpitations have improved somewhat with initiation of toprol. Chest pain has resolved.  Past medical history of allergic rhinitis, hypertension, palpitations, anxiety  Objective     Vital Signs:   Vitals:    01/04/24 1045   BP: 150/90   BP Location: Left arm   Patient Position: Sitting   Pulse: 90   Weight: 86.2 kg (190 lb)   Height: 185.4 cm (73\")       Body mass index is 25.07 kg/m².  Physical Exam  Vitals and nursing note reviewed.   Constitutional:       Appearance: Normal appearance.   HENT:      Head: Normocephalic.   Pulmonary:      Effort: Pulmonary effort is normal.   Neurological:      Mental Status: He is alert and oriented to person, place, and time.   Psychiatric:         Mood and Affect: Mood normal.         Behavior: Behavior normal.         Thought Content: Thought content " normal.              Result Review  Data Reviewed:{ Labs  Result Review  Imaging  Med Tab  Media :23}   Stress Test With Pet Myocardial Perfusion (08/03/2021 08:57)  Adult Transthoracic Echo Complete W/ Cont if Necessary Per Protocol (08/03/2021 11:39)  ECG 12 Lead Dyspnea (11/20/2023 05:28)  TSH (11/20/2023 05:54)  Magnesium (11/20/2023 05:54)  Single High Sensitivity Troponin T (11/20/2023 05:54)  Comprehensive Metabolic Panel (11/20/2023 05:54)  CBC & Differential (11/20/2023 05:54)  Holter Monitor - 72 Hour Up To 15 Days (11/30/2023 16:55)  Adult Transthoracic Echo Complete W/ Cont if Necessary Per Protocol (12/10/2023 13:58)         Assessment and Plan {CC Problem List  Visit Diagnosis  ROS  Review (Popup)  Health Maintenance  Quality  BestPractice  Medications  SmartSets  SnapShot Encounters  Media :23}   1. Palpitations  Increase toprol to 100 mg daily    2. Essential hypertension  Increase toprol to 100 mg daily   Monitor bp closely             Follow Up {Instructions Charge Capture  Follow-up Communications :23}   Return in about 3 weeks (around 1/25/2024) for Telemedicine visit, HTN.    Patient was given instructions and counseling regarding his condition or for health maintenance advice. Please see specific information pulled into the AVS if appropriate.  Patient was instructed to call the [facility] with any questions, concerns, or worsening symptoms.

## 2024-01-04 NOTE — PROGRESS NOTES
"Progress Note    Subjective      Panda \"Laith\" is a 48 y.o. male.    Chief Complaint   Patient presents with    Anxiety     Has gotten better, not liling the lexapro, wanting to see if there is another medication        HPI  Palpitations  Today:  - ECHO normal  - Holter did not show any abnormality  - Cards increased his BB  - no palpitations since last visit    Last Visit:  - feels like heart is skipping  - feels like it is going fast  - it feels like a quick extra beat or 2 and then goes away but then comes back in mins-hrs  - no chest pain  - sometimes feel lightheaded  - never passed out  - had an episode at work where felt light headed and palpitations and [place] [facility] felt like he had a panic attack  - labs at [facility] normal  - EKG normal  - placed on Metoprolol by ED and given Ativan  - had a stress test with [facility] that was normal around 2 yrs ago per patient  - hydrate well, 40-50oz of water per day  - cup of coffee only in the am  - does not notice any difference around bedtime, activity, exertion, prolonged standing     Anxiety  Insomnia  Fatigue  - GAD7=11 moderate anxiety on first appt  - fidgets, feels like hard to stop motor, worries excessively about several things, trouble relaxing, does impact work/home/interaction with people, feels like something awful might happen  - patient was started on Lexapro last visit and does not like sexual side effects  - he does not feel that anxiety has changed  - he took Hydroxyzine 25mg once and slept 4-5hrs instead of only 2hrs  - he did not try again on another night, he is not sure why  - he did not fell groggy post that  - he is worried that his fatigue is due to low testosterone  - we discussed the importance of treating other factors like insomnia  - he is willing to to try Hydroxyzine again  - did do counseling in the past and was referred internally at [facility] to the Psych NP and was started on Seroquel and did not like that   - he then " "quit therapy too    Elevated BP  - today 128/78  - also struggling with anxiety   - no cp  - he reports running 10 points higher on home automated cuff   - both in person visits at our office were 120-130  - both TH appt in system with reported 150     Past Medical History:  Patient Active Problem List   Diagnosis    Anxiety    Palpitations    Elevated BP without diagnosis of hypertension    Allergic rhinitis    Insomnia due to other mental disorder       Medications:  Current Outpatient Medications on File Prior to Visit   Medication Sig Dispense Refill    albuterol sulfate  (90 Base) MCG/ACT inhaler Inhale 2 puffs Every 4 (Four) Hours As Needed for Wheezing. 6.7 g 0    b complex vitamins capsule Take 1 capsule by mouth.      escitalopram (LEXAPRO) 10 MG tablet Take 1 tablet by mouth Daily. 30 tablet 1    hydrOXYzine (ATARAX) 25 MG tablet 1-2 tabs po q8hr PRN sleep/anxiety 60 tablet 1    LORazepam (ATIVAN) 0.5 MG tablet       metoprolol succinate XL (Toprol XL) 100 MG 24 hr tablet Take 1 tablet by mouth Daily. 30 tablet 3    [DISCONTINUED] metoprolol succinate XL (TOPROL-XL) 50 MG 24 hr tablet Take 1 tablet by mouth Daily. 30 tablet 11     No current facility-administered medications on file prior to visit.       Allergies:   Allergies   Allergen Reactions    Iodinated Contrast Media Anaphylaxis       Immunizations:    There is no immunization history on file for this patient.     Family History:  Family History   Problem Relation Age of Onset    Heart disease Brother     Diabetes Brother        Objective   /78   Pulse 75   Temp 98.2 °F (36.8 °C)   Resp 14   Ht 185.4 cm (72.99\")   Wt 86.2 kg (190 lb)   SpO2 96%   BMI 25.07 kg/m²        Physical Exam  Vitals reviewed.   Constitutional:       General: He is not in acute distress.     Appearance: Normal appearance.   HENT:      Mouth/Throat:      Mouth: Mucous membranes are moist.   Eyes:      Conjunctiva/sclera: Conjunctivae normal. "   Cardiovascular:      Rate and Rhythm: Normal rate and regular rhythm.      Heart sounds: Normal heart sounds. No murmur heard.  Pulmonary:      Effort: Pulmonary effort is normal. No respiratory distress.      Breath sounds: Normal breath sounds.   Neurological:      Mental Status: He is alert.   Psychiatric:      Comments: Anxious and poor insight         Assessment & Plan     Palpitations  - Resolved since last visit  - Since last visit saw Cards and Holter and ECHO ordered and both were normal, reviewed both results today  - Cards increased Toprol to 100mg daily  - 11/2923 ED labs: CBC without anemia, CMP without electrolyte disturbance, TSH normal, Mg normal  - 11/2023 CXR that was normal  - 11/2023 EKG NSR  - previous PCP and [place] [facility] felt that he was having DEACON with Panic attacks  - hydration appropriate and minimal caffeine     DEACON and Panic Attacks  - GAD7 last visit score 11 indicating moderate anxiety  - will stop Lexapro 10mg as disliked sexual side effects, advised can stop, if has any sx can take 5mg for 2 weeks and then off  - encouraged him to try Hydroxyzine for PRN anxiety, advised to try at home first  - discussed counseling, will continue to recommend  - encouraged him to try counseling and recommended [facility]  - patient will think about counseling, though seems reluctant, last tried [facility] and they referred internally to Psych and was given Seroquel for sleep and did not like med, advised if he tried counseling again to just be clear he is only looking for counseling  - patient reported this visit that he is in midst of divorce but reports that this is not impacting him or his childhood trauma, discussed that sometimes these things can have a greater impact on us than we realize     Insomnia  - start Hydroxyzine   - patient tried 25mg once since last visit and slept 4-5hrs instead of 2hrs, did not try further and not sure why  - patient  encourage to try 50mg prior to bed as was not groggy with 25mg and did improve sleep. Patient concerned about testosterone level and that is why he is fatigued, I encouraged him to try to improve sleep first before pursuing that testing     Allergic Rhinitis  -continue  Zyrtec  - continue Nasocort  - counseled on importance of taking daily     Elevated BP  - patient reports in the ED he has had systolics of 190-200  - these Bps were during times of anxiety and ED visits  - both visits in my office with manual -130s  - likely related to anxiety  - encouraged him to bring home cuff to next appt as reports it is running 10pts higher and also instructed on optimal ways to measure BP at home.       FU in 2mo for annual visit for preventative health    Bessie Nieves MD, FAAP, FACP  Internal Medicine and Pediatrics  [facility]

## 2024-01-25 ENCOUNTER — TELEMEDICINE (OUTPATIENT)
Dept: CARDIOLOGY | Facility: HOSPITAL | Age: 49
End: 2024-01-25
Payer: COMMERCIAL

## 2024-01-26 VITALS
DIASTOLIC BLOOD PRESSURE: 87 MMHG | WEIGHT: 190 LBS | HEIGHT: 73 IN | SYSTOLIC BLOOD PRESSURE: 146 MMHG | BODY MASS INDEX: 25.18 KG/M2

## 2024-01-26 NOTE — PROGRESS NOTES
"Chief Complaint  Follow-up (Htn, palpitations )    Subjective    History of Present Illness {CC  Problem List  Visit  Diagnosis   Encounters  Notes  Medications  Labs  Result Review Imaging  Media :23}   You have chosen to receive care through the use of telemedicine. Telemedicine enables health care providers at different locations to provide safe, effective, and convenient care through the use of technology. As with any health care service, there are risks associated with the use of telemedicine, including equipment failure, poor connections, and  issues.    Do you understand the risks and benefits of telemedicine as I have explained them to you? Yes  Have your questions regarding telemedicine been answered? Yes  Do you consent to the use of telemedicine in your medical care today? Yes     History of Present Illness   47 year old presents for telehealth visit today for ongoing evaluation of his chest pain, palpitations and elevated blood pressure readings. He reports that his bp has improved and is running 130-140s. He also reports that his heart rate is still running in the 90s. Notes that palpitations have improved somewhat with initiation of toprol. Chest pain has resolved.  Past medical history of allergic rhinitis, hypertension, palpitations, anxiety. Notes that he is under a lot of stress right now. He is exercising every day at the gym.  Objective     Vital Signs:   Vitals:    01/25/24 0947   BP: 146/87   BP Location: Left arm   Patient Position: Sitting   Weight: 86.2 kg (190 lb)   Height: 185.4 cm (73\")       Body mass index is 25.07 kg/m².  Physical Exam  Vitals and nursing note reviewed.   Constitutional:       Appearance: Normal appearance.   HENT:      Head: Normocephalic.   Pulmonary:      Effort: Pulmonary effort is normal.   Neurological:      Mental Status: He is alert and oriented to person, place, and time.   Psychiatric:         Mood and Affect: Mood normal.        "  Behavior: Behavior normal.         Thought Content: Thought content normal.              Result Review  Data Reviewed:{ Labs  Result Review  Imaging  Med Tab  Media :23}   Stress Test With Pet Myocardial Perfusion (08/03/2021 08:57)  Adult Transthoracic Echo Complete W/ Cont if Necessary Per Protocol (08/03/2021 11:39)  ECG 12 Lead Dyspnea (11/20/2023 05:28)  TSH (11/20/2023 05:54)  Magnesium (11/20/2023 05:54)  Single High Sensitivity Troponin T (11/20/2023 05:54)  Comprehensive Metabolic Panel (11/20/2023 05:54)  CBC & Differential (11/20/2023 05:54)  Holter Monitor - 72 Hour Up To 15 Days (11/30/2023 16:55)  Adult Transthoracic Echo Complete W/ Cont if Necessary Per Protocol (12/10/2023 13:58)         Assessment and Plan {CC Problem List  Visit Diagnosis  ROS  Review (Popup)  Health Maintenance  Quality  BestPractice  Medications  SmartSets  SnapShot Encounters  Media :23}   1. Palpitations  Stable on  toprol to 100 mg daily    2. Essential hypertension  Stable on toprol to 100 mg daily   Monitor bp closely   Follow low sodium diet and continue daily exercise          Follow Up {Instructions Charge Capture  Follow-up Communications :23}   Return if symptoms worsen or fail to improve.    Patient was given instructions and counseling regarding his condition or for health maintenance advice. Please see specific information pulled into the AVS if appropriate.  Patient was instructed to call the [facility] with any questions, concerns, or worsening symptoms.

## 2024-02-20 PROCEDURE — 87635 SARS-COV-2 COVID-19 AMP PRB: CPT | Performed by: FAMILY MEDICINE

## 2024-07-18 NOTE — PROGRESS NOTES
[facility] Heart Monitor Documentation    Panda Garcia  1975  [id number]  11/30/23      [] ZIO XT Patch  Model [id number] Prescribed for N/A Days    Serial Number: (N + 9 Digits) N   Apply-By Date on Box:   USPS Tracking Number:   USPS Tracking        [] [employer] MINI PLUS Mobile Cardiac Telemetry  Model BGMINIPLUS Prescribed for N/A Days    Serial Number: (BGM + 7 Digits) BGm  Shipped-By Date on Box:   UPS Tracking Number: [id number]  UPS Tracking      [] [employer] MINI Holter Monitor  Model BGMINIEL Prescribed for 7 Days    Serial Number: (7 Digits) [id number]  Shipped-By Date on Box: [id number]  UPS Tracking Number: [id number]  UPS Tracking        This monitor was applied to the patient's chest and checked for proper functioning.  Mr. Panda Garcia was instructed in the proper use of this monitor.  He was given the opportunity to ask questions and left the office with the device 's instruction manual.    Ashley Martinez MA, 16:20 EST, 11/30/23                  [facility] HospitalITORDOCUMENTATION 8.8.2019    Chadd, Adrienne Rizzo, Amanda